# Patient Record
Sex: FEMALE | Race: WHITE | NOT HISPANIC OR LATINO | URBAN - METROPOLITAN AREA
[De-identification: names, ages, dates, MRNs, and addresses within clinical notes are randomized per-mention and may not be internally consistent; named-entity substitution may affect disease eponyms.]

---

## 2017-09-16 ENCOUNTER — OUTPATIENT (OUTPATIENT)
Dept: OUTPATIENT SERVICES | Facility: HOSPITAL | Age: 57
LOS: 1 days | Discharge: HOME | End: 2017-09-16

## 2017-09-16 DIAGNOSIS — N20.2 CALCULUS OF KIDNEY WITH CALCULUS OF URETER: ICD-10-CM

## 2017-09-16 DIAGNOSIS — S92.909A UNSPECIFIED FRACTURE OF UNSPECIFIED FOOT, INITIAL ENCOUNTER FOR CLOSED FRACTURE: ICD-10-CM

## 2017-09-16 DIAGNOSIS — Z12.31 ENCOUNTER FOR SCREENING MAMMOGRAM FOR MALIGNANT NEOPLASM OF BREAST: ICD-10-CM

## 2017-09-16 DIAGNOSIS — R05 COUGH: ICD-10-CM

## 2017-09-16 DIAGNOSIS — M54.5 LOW BACK PAIN: ICD-10-CM

## 2017-09-16 DIAGNOSIS — R10.2 PELVIC AND PERINEAL PAIN: ICD-10-CM

## 2017-09-16 DIAGNOSIS — M25.569 PAIN IN UNSPECIFIED KNEE: ICD-10-CM

## 2017-09-28 ENCOUNTER — APPOINTMENT (OUTPATIENT)
Dept: VASCULAR SURGERY | Facility: CLINIC | Age: 57
End: 2017-09-28
Payer: MEDICAID

## 2017-09-28 VITALS
HEIGHT: 66 IN | SYSTOLIC BLOOD PRESSURE: 124 MMHG | BODY MASS INDEX: 28.12 KG/M2 | WEIGHT: 175 LBS | DIASTOLIC BLOOD PRESSURE: 82 MMHG

## 2017-09-28 DIAGNOSIS — Z87.891 PERSONAL HISTORY OF NICOTINE DEPENDENCE: ICD-10-CM

## 2017-09-28 PROCEDURE — 99203 OFFICE O/P NEW LOW 30 MIN: CPT

## 2017-09-28 PROCEDURE — 93970 EXTREMITY STUDY: CPT

## 2018-10-16 ENCOUNTER — OUTPATIENT (OUTPATIENT)
Dept: OUTPATIENT SERVICES | Facility: HOSPITAL | Age: 58
LOS: 1 days | Discharge: HOME | End: 2018-10-16

## 2018-10-16 DIAGNOSIS — Z12.31 ENCOUNTER FOR SCREENING MAMMOGRAM FOR MALIGNANT NEOPLASM OF BREAST: ICD-10-CM

## 2018-10-16 DIAGNOSIS — R05 COUGH: ICD-10-CM

## 2019-03-21 ENCOUNTER — APPOINTMENT (OUTPATIENT)
Dept: VASCULAR SURGERY | Facility: CLINIC | Age: 59
End: 2019-03-21

## 2019-04-18 ENCOUNTER — APPOINTMENT (OUTPATIENT)
Dept: VASCULAR SURGERY | Facility: CLINIC | Age: 59
End: 2019-04-18

## 2019-07-25 ENCOUNTER — APPOINTMENT (OUTPATIENT)
Dept: VASCULAR SURGERY | Facility: CLINIC | Age: 59
End: 2019-07-25
Payer: MEDICAID

## 2019-07-25 VITALS
SYSTOLIC BLOOD PRESSURE: 118 MMHG | BODY MASS INDEX: 28.12 KG/M2 | HEIGHT: 66 IN | WEIGHT: 175 LBS | DIASTOLIC BLOOD PRESSURE: 76 MMHG

## 2019-07-25 PROCEDURE — 99213 OFFICE O/P EST LOW 20 MIN: CPT

## 2019-07-25 PROCEDURE — 93971 EXTREMITY STUDY: CPT

## 2019-07-25 NOTE — DATA REVIEWED
[FreeTextEntry1] : Venous duplex showed no evidence of right lower extremity DVT . The right GSV was not incompetent, but multiple large incompetent varicosities noted in the thigh measuring 1.2 cm.

## 2019-07-25 NOTE — ASSESSMENT
[FreeTextEntry1] : Ms. Boss is a 58 year-old female with symptomatic large right lower extremities gross varicosities. Venous duplex showed no evidence of right lower extremity DVT . The right GSV was not incompetent, but multiple large incompetent varicosities noted in the thigh measuring 1.2 cm. She will benefit from stab phlebectomy for treatment of her right lower extremity symptomatic veins. I will plan on scheduling her once approval is obtained.

## 2019-07-25 NOTE — HISTORY OF PRESENT ILLNESS
[FreeTextEntry1] : Ms. Boss is a 58 year-old female with long standing history of varicose veins who underwent ligation on the right lower extremity in the past. She reports that she has been having symptoms of leg swelling, heaviness and discomfort over the veins , right worse than left, mostly at the end of the day or after long periods of walking or standing. She has tried compression stockings in the past and has failed to relieve her symptoms.

## 2019-10-17 ENCOUNTER — OUTPATIENT (OUTPATIENT)
Dept: OUTPATIENT SERVICES | Facility: HOSPITAL | Age: 59
LOS: 1 days | Discharge: HOME | End: 2019-10-17
Payer: MEDICAID

## 2019-10-17 DIAGNOSIS — Z87.891 PERSONAL HISTORY OF NICOTINE DEPENDENCE: ICD-10-CM

## 2019-10-17 DIAGNOSIS — Z12.31 ENCOUNTER FOR SCREENING MAMMOGRAM FOR MALIGNANT NEOPLASM OF BREAST: ICD-10-CM

## 2019-10-17 PROCEDURE — 71046 X-RAY EXAM CHEST 2 VIEWS: CPT | Mod: 26

## 2019-10-17 PROCEDURE — 77067 SCR MAMMO BI INCL CAD: CPT | Mod: 26

## 2019-10-17 PROCEDURE — 77063 BREAST TOMOSYNTHESIS BI: CPT | Mod: 26

## 2019-10-28 ENCOUNTER — APPOINTMENT (OUTPATIENT)
Dept: GASTROENTEROLOGY | Facility: HOSPITAL | Age: 59
End: 2019-10-28

## 2020-07-23 ENCOUNTER — APPOINTMENT (OUTPATIENT)
Dept: VASCULAR SURGERY | Facility: CLINIC | Age: 60
End: 2020-07-23

## 2020-07-25 ENCOUNTER — EMERGENCY (EMERGENCY)
Facility: HOSPITAL | Age: 60
LOS: 0 days | Discharge: HOME | End: 2020-07-25
Attending: EMERGENCY MEDICINE | Admitting: EMERGENCY MEDICINE
Payer: MEDICAID

## 2020-07-25 VITALS
OXYGEN SATURATION: 98 % | RESPIRATION RATE: 16 BRPM | WEIGHT: 179.9 LBS | HEART RATE: 74 BPM | SYSTOLIC BLOOD PRESSURE: 119 MMHG | DIASTOLIC BLOOD PRESSURE: 78 MMHG | TEMPERATURE: 98 F
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
## 2020-07-25 DIAGNOSIS — M79.659 PAIN IN UNSPECIFIED THIGH: ICD-10-CM
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
## 2020-07-25 DIAGNOSIS — S39.011A STRAIN OF MUSCLE, FASCIA AND TENDON OF ABDOMEN, INITIAL ENCOUNTER: ICD-10-CM
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
## 2020-07-25 DIAGNOSIS — Z88.0 ALLERGY STATUS TO PENICILLIN: ICD-10-CM
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
## 2020-07-25 PROCEDURE — 99284 EMERGENCY DEPT VISIT MOD MDM: CPT

## 2020-07-25 RX ORDER — KETOROLAC TROMETHAMINE 30 MG/ML
1 SYRINGE (ML) INJECTION
Qty: 20 | Refills: 0
Start: 2020-07-25 | End: 2020-07-29

## 2020-07-25 RX ORDER — OXYCODONE AND ACETAMINOPHEN 5; 325 MG/1; MG/1
1 TABLET ORAL ONCE
Refills: 0 | Status: DISCONTINUED | OUTPATIENT
Start: 2020-07-25 | End: 2020-07-25

## 2020-07-25 RX ORDER — IBUPROFEN 200 MG
600 TABLET ORAL ONCE
Refills: 0 | Status: COMPLETED | OUTPATIENT
Start: 2020-07-25 | End: 2020-07-25

## 2020-07-25 RX ORDER — METHOCARBAMOL 500 MG/1
1000 TABLET, FILM COATED ORAL ONCE
Refills: 0 | Status: COMPLETED | OUTPATIENT
Start: 2020-07-25 | End: 2020-07-25

## 2020-07-25 RX ORDER — IBUPROFEN 200 MG
1 TABLET ORAL
Qty: 21 | Refills: 0
Start: 2020-07-25 | End: 2020-07-31

## 2020-07-25 RX ORDER — METHOCARBAMOL 500 MG/1
1 TABLET, FILM COATED ORAL
Qty: 21 | Refills: 0
Start: 2020-07-25 | End: 2020-07-31

## 2020-07-25 RX ADMIN — OXYCODONE AND ACETAMINOPHEN 1 TABLET(S): 5; 325 TABLET ORAL at 08:22

## 2020-07-25 RX ADMIN — Medication 600 MILLIGRAM(S): at 08:23

## 2020-07-25 RX ADMIN — Medication 600 MILLIGRAM(S): at 07:33

## 2020-07-25 RX ADMIN — METHOCARBAMOL 1000 MILLIGRAM(S): 500 TABLET, FILM COATED ORAL at 07:33

## 2020-07-25 RX ADMIN — OXYCODONE AND ACETAMINOPHEN 1 TABLET(S): 5; 325 TABLET ORAL at 09:28

## 2020-07-25 NOTE — ED ADULT NURSE NOTE - OBJECTIVE STATEMENT
pt A&ox3, pt states right groin pain since Thursday that has been worsening. pt states that she was cleaning, denies recent falls.

## 2020-07-25 NOTE — ED PROVIDER NOTE - PATIENT PORTAL LINK FT
You can access the FollowMyHealth Patient Portal offered by [facility] by registering at the following website: [email or web address]. By joining [email or web address]’s FollowMyHealth portal, you will also be able to view your health information using other applications (apps) compatible with our system.

## 2020-07-25 NOTE — ED PROVIDER NOTE - ATTENDING CONTRIBUTION TO CARE
59 year old female, no pmhx, presenting with right groin pain x 2 days that has been worsening. States she was cleaning her home at which time she felt a pull in her groin and since then has had pain described as achy, crampy, non-radiating, worse with various movements, relieved with rest, moderate severity. Denies having this before. Otherwise denies direct trauma, fevers, incontinence, numbness/weakness or any other symptoms. Has been able to ambulate.    Vital Signs: I have reviewed the initial vital signs.  Constitutional: NAD, well-nourished, appears stated age, no acute distress.  HEENT: Airway patent, moist MM, no erythema/swelling/deformity of oral structures. EOMI, PERRLA.  CV: regular rate, regular rhythm, well-perfused extremities, 2+ b/l DP and radial pulses equal.  Lungs: BCTA, no increased WOB.  ABD: NTND, no guarding or rebound, no pulsatile mass, no hernias.   MSK: Neck supple, nontender, nl ROM, no stepoff. Chest nontender. Back nontender in TLS spine or to b/l bony structures or flanks. Ext nontender, nl rom, no deformity.   INTEG: Skin warm, dry, no rash.  NEURO: A&Ox3, normal strength, nl sensation throughout, normal speech.   PSYCH: Calm, cooperative, normal affect and interaction.    Full ROM of leg but with pain. Likely muscular strain. Able to bear weight on the leg, neurovascularly intact. Will treat symptomatically, re-eval.

## 2020-07-25 NOTE — ED PROVIDER NOTE - CLINICAL SUMMARY MEDICAL DECISION MAKING FREE TEXT BOX
Patient presented s/p right groin strain while cleaning her home 2 days ago. Otherwise afebrile, HD stable, neurovascularly intact, no signs of infection on exam, no concern for fx based on exam. Able to bear weight on the leg. Given NSAID and muscle relaxer in ED with significant improvement of symptoms, after which time patient able to ambulate without difficulty. Will discharge home with outpatient follow up. Patient agreeable with plan. Agrees to return to ED for any new or worsening symptoms.

## 2020-07-25 NOTE — ED PROVIDER NOTE - NSFOLLOWUPINSTRUCTIONS_ED_ALL_ED_FT
Groin Pain    WHAT YOU NEED TO KNOW:    Groin pain may be felt only in your groin, or it may spread to your buttocks, thigh, or knee. An injury to your hip joint, pelvic area, lower back, or thighs can cause groin pain.    DISCHARGE INSTRUCTIONS:    Medicines: You may need any of the following:     NSAIDs, such as ibuprofen, help decrease swelling, pain, and fever. This medicine is available with or without a doctor's order. NSAIDs can cause stomach bleeding or kidney problems in certain people. If you take blood thinner medicine, always ask if NSAIDs are safe for you. Always read the medicine label and follow directions. Do not give these medicines to children under 6 months of age without direction from your child's healthcare provider.      Acetaminophen decreases pain. It is available without a doctor's order. Ask how much to take and how often to take it. Follow directions. Acetaminophen can cause liver damage if not taken correctly.       Take your medicine as directed. Contact your healthcare provider if you think your medicine is not helping or if you have side effects. Tell him of her if you are allergic to any medicine. Keep a list of the medicines, vitamins, and herbs you take. Include the amounts, and when and why you take them. Bring the list or the pill bottles to follow-up visits. Carry your medicine list with you in case of an emergency.    Follow up with your healthcare provider as directed: You may need to return for more tests. Write down your questions so you remember to ask them during your visits.     Self-care:     Rest as much as possible. Avoid activities that cause or increase your pain.      Apply ice on your groin for 15 to 20 minutes every hour or as directed. Use an ice pack, or put crushed ice in a plastic bag. Cover it with a towel. Ice helps prevent tissue damage and decreases swelling and pain.       Apply heat on your groin for 20 to 30 minutes every 2 hours for as many days as directed. Heat helps decrease pain and muscle spasms.     Contact your healthcare provider if:     You have a fever.       You have questions or concerns about your condition or care.    Return to the emergency department if:     You have severe pain even after you take medicine.       You have pain or burning when you urinate.       You have pain on your side that spreads to your groin.         © Copyright [employer] 2019 All illustrations and images included in CareNotes are the copyrighted property of [employer]M., Inc[employer] or [employer].

## 2020-07-25 NOTE — ED PROVIDER NOTE - OBJECTIVE STATEMENT
58 y/o female no PMHx BIBA c/o "I have right groin pain since Thursday after cleaning. It started slowly and has gotten much more painful. Pain is worse with movement of my leg." no hx trauma/ abd pain/ n/v/d/fever/ chills/ urinary symptoms

## 2020-07-25 NOTE — ED ADULT NURSE NOTE - NSIMPLEMENTINTERV_GEN_ALL_ED
Implemented All Universal Safety Interventions:  [place] to call system. Call bell, personal items and telephone within reach. Instruct patient to call for assistance. Room bathroom lighting operational. Non-slip footwear when patient is off stretcher. Physically safe environment: no spills, clutter or unnecessary equipment. Stretcher in lowest position, wheels locked, appropriate side rails in place.

## 2020-08-10 ENCOUNTER — APPOINTMENT (OUTPATIENT)
Dept: VASCULAR SURGERY | Facility: CLINIC | Age: 60
End: 2020-08-10

## 2020-09-03 ENCOUNTER — APPOINTMENT (OUTPATIENT)
Dept: VASCULAR SURGERY | Facility: CLINIC | Age: 60
End: 2020-09-03
Payer: MEDICAID

## 2020-09-03 VITALS
SYSTOLIC BLOOD PRESSURE: 120 MMHG | BODY MASS INDEX: 29.86 KG/M2 | WEIGHT: 185 LBS | DIASTOLIC BLOOD PRESSURE: 68 MMHG | TEMPERATURE: 97.7 F

## 2020-09-03 DIAGNOSIS — I83.90 ASYMPTOMATIC VARICOSE VEINS OF UNSPECIFIED LOWER EXTREMITY: ICD-10-CM

## 2020-09-03 PROCEDURE — 99213 OFFICE O/P EST LOW 20 MIN: CPT

## 2020-09-03 PROCEDURE — 93971 EXTREMITY STUDY: CPT

## 2020-09-03 NOTE — ASSESSMENT
[FreeTextEntry1] : Ms. Boss is a 58 year-old female with symptomatic large right lower extremities gross varicosities. Venous duplex showed no evidence of right lower extremity DVT . The right GSV was not incompetent, but multiple large incompetent varicosities noted in the thigh measuring 1.6 cm. She will benefit from stab phlebectomy for treatment of her right lower extremity symptomatic veins. I will plan on scheduling her once approval is obtained.

## 2020-09-17 ENCOUNTER — OUTPATIENT (OUTPATIENT)
Dept: OUTPATIENT SERVICES | Facility: HOSPITAL | Age: 60
LOS: 1 days | Discharge: HOME | End: 2020-09-17
Payer: MEDICAID

## 2020-09-17 PROCEDURE — 71046 X-RAY EXAM CHEST 2 VIEWS: CPT | Mod: 26

## 2020-10-13 ENCOUNTER — OUTPATIENT (OUTPATIENT)
Dept: OUTPATIENT SERVICES | Facility: HOSPITAL | Age: 60
LOS: 1 days | Discharge: HOME | End: 2020-10-13
Payer: MEDICAID

## 2020-10-13 VITALS
TEMPERATURE: 98 F | WEIGHT: 179.9 LBS | HEIGHT: 65 IN | DIASTOLIC BLOOD PRESSURE: 70 MMHG | HEART RATE: 78 BPM | OXYGEN SATURATION: 97 % | RESPIRATION RATE: 16 BRPM | SYSTOLIC BLOOD PRESSURE: 126 MMHG

## 2020-10-13 DIAGNOSIS — I83.891 VARICOSE VEINS OF RIGHT LOWER EXTREMITY WITH OTHER COMPLICATIONS: ICD-10-CM

## 2020-10-13 DIAGNOSIS — Z01.818 ENCOUNTER FOR OTHER PREPROCEDURAL EXAMINATION: ICD-10-CM

## 2020-10-13 DIAGNOSIS — Z98.890 OTHER SPECIFIED POSTPROCEDURAL STATES: Chronic | ICD-10-CM

## 2020-10-13 DIAGNOSIS — Z98.891 HISTORY OF UTERINE SCAR FROM PREVIOUS SURGERY: Chronic | ICD-10-CM

## 2020-10-13 LAB
ALBUMIN SERPL ELPH-MCNC: 4.7 G/DL — SIGNIFICANT CHANGE UP (ref 3.5–5.2)
ALP SERPL-CCNC: 94 U/L — SIGNIFICANT CHANGE UP (ref 30–115)
ALT FLD-CCNC: 20 U/L — SIGNIFICANT CHANGE UP (ref 0–41)
ANION GAP SERPL CALC-SCNC: 12 MMOL/L — SIGNIFICANT CHANGE UP (ref 7–14)
APTT BLD: 32.3 SEC — SIGNIFICANT CHANGE UP (ref 27–39.2)
AST SERPL-CCNC: 19 U/L — SIGNIFICANT CHANGE UP (ref 0–41)
BASOPHILS # BLD AUTO: 0.04 K/UL — SIGNIFICANT CHANGE UP (ref 0–0.2)
BASOPHILS NFR BLD AUTO: 0.4 % — SIGNIFICANT CHANGE UP (ref 0–1)
BILIRUB SERPL-MCNC: 0.2 MG/DL — SIGNIFICANT CHANGE UP (ref 0.2–1.2)
BUN SERPL-MCNC: 18 MG/DL — SIGNIFICANT CHANGE UP (ref 10–20)
CALCIUM SERPL-MCNC: 9.9 MG/DL — SIGNIFICANT CHANGE UP (ref 8.5–10.1)
CHLORIDE SERPL-SCNC: 100 MMOL/L — SIGNIFICANT CHANGE UP (ref 98–110)
CO2 SERPL-SCNC: 25 MMOL/L — SIGNIFICANT CHANGE UP (ref 17–32)
CREAT SERPL-MCNC: 0.8 MG/DL — SIGNIFICANT CHANGE UP (ref 0.7–1.5)
EOSINOPHIL # BLD AUTO: 0.32 K/UL — SIGNIFICANT CHANGE UP (ref 0–0.7)
EOSINOPHIL NFR BLD AUTO: 3.1 % — SIGNIFICANT CHANGE UP (ref 0–8)
GLUCOSE SERPL-MCNC: 86 MG/DL — SIGNIFICANT CHANGE UP (ref 70–99)
HCT VFR BLD CALC: 43.4 % — SIGNIFICANT CHANGE UP (ref 37–47)
HGB BLD-MCNC: 14.5 G/DL — SIGNIFICANT CHANGE UP (ref 12–16)
IMM GRANULOCYTES NFR BLD AUTO: 0.5 % — HIGH (ref 0.1–0.3)
INR BLD: 0.89 RATIO — SIGNIFICANT CHANGE UP (ref 0.65–1.3)
LYMPHOCYTES # BLD AUTO: 2.74 K/UL — SIGNIFICANT CHANGE UP (ref 1.2–3.4)
LYMPHOCYTES # BLD AUTO: 26.5 % — SIGNIFICANT CHANGE UP (ref 20.5–51.1)
MCHC RBC-ENTMCNC: 29.8 PG — SIGNIFICANT CHANGE UP (ref 27–31)
MCHC RBC-ENTMCNC: 33.4 G/DL — SIGNIFICANT CHANGE UP (ref 32–37)
MCV RBC AUTO: 89.3 FL — SIGNIFICANT CHANGE UP (ref 81–99)
MONOCYTES # BLD AUTO: 0.8 K/UL — HIGH (ref 0.1–0.6)
MONOCYTES NFR BLD AUTO: 7.7 % — SIGNIFICANT CHANGE UP (ref 1.7–9.3)
NEUTROPHILS # BLD AUTO: 6.38 K/UL — SIGNIFICANT CHANGE UP (ref 1.4–6.5)
NEUTROPHILS NFR BLD AUTO: 61.8 % — SIGNIFICANT CHANGE UP (ref 42.2–75.2)
NRBC # BLD: 0 /100 WBCS — SIGNIFICANT CHANGE UP (ref 0–0)
PLATELET # BLD AUTO: 263 K/UL — SIGNIFICANT CHANGE UP (ref 130–400)
POTASSIUM SERPL-MCNC: 4.1 MMOL/L — SIGNIFICANT CHANGE UP (ref 3.5–5)
POTASSIUM SERPL-SCNC: 4.1 MMOL/L — SIGNIFICANT CHANGE UP (ref 3.5–5)
PROT SERPL-MCNC: 7.2 G/DL — SIGNIFICANT CHANGE UP (ref 6–8)
PROTHROM AB SERPL-ACNC: 10.2 SEC — SIGNIFICANT CHANGE UP (ref 9.95–12.87)
RBC # BLD: 4.86 M/UL — SIGNIFICANT CHANGE UP (ref 4.2–5.4)
RBC # FLD: 12.7 % — SIGNIFICANT CHANGE UP (ref 11.5–14.5)
SODIUM SERPL-SCNC: 137 MMOL/L — SIGNIFICANT CHANGE UP (ref 135–146)
WBC # BLD: 10.33 K/UL — SIGNIFICANT CHANGE UP (ref 4.8–10.8)
WBC # FLD AUTO: 10.33 K/UL — SIGNIFICANT CHANGE UP (ref 4.8–10.8)

## 2020-10-13 PROCEDURE — 93010 ELECTROCARDIOGRAM REPORT: CPT

## 2020-10-13 NOTE — H&P PST ADULT - HISTORY OF PRESENT ILLNESS
60yr old female states has had varicose veins for more than 10yrs but has been having increased pressure in the RT LE while walking -over the last 3m-presents to pretesting for Right leg stab phlebectomy of varicose veins. Denies COVID S/S. Denies sick contacts. Advised to follow preventive measures for COVID-Verbalized understanding of instructions. Exercise jhonny 3 FOS.  Anesthesia Alert  NO--Difficult Airway  NO--History of neck surgery or radiation  NO--Limited ROM of neck  NO--History of Malignant hyperthermia  NO--Personal or family history of Pseudocholinesterase deficiency  NO--Prior Anesthesia Complication  NO--Latex Allergy  NO--Loose teeth  NO--History of Rheumatoid Arthritis  NO--JOY  NO--Other_____

## 2020-10-22 PROBLEM — Z86.79 PERSONAL HISTORY OF OTHER DISEASES OF THE CIRCULATORY SYSTEM: Chronic | Status: ACTIVE | Noted: 2020-10-13

## 2020-10-26 ENCOUNTER — APPOINTMENT (OUTPATIENT)
Dept: OTOLARYNGOLOGY | Facility: CLINIC | Age: 60
End: 2020-10-26
Payer: MEDICAID

## 2020-10-26 VITALS — BODY MASS INDEX: 29.05 KG/M2 | WEIGHT: 180 LBS

## 2020-10-26 DIAGNOSIS — E04.2 NONTOXIC MULTINODULAR GOITER: ICD-10-CM

## 2020-10-26 PROCEDURE — 99203 OFFICE O/P NEW LOW 30 MIN: CPT

## 2020-10-26 PROCEDURE — 99072 ADDL SUPL MATRL&STAF TM PHE: CPT

## 2020-10-26 NOTE — HISTORY OF PRESENT ILLNESS
[de-identified] : Patient presents today due to thyroid nodules. Patient admits first time having abnormal thyroid blood work. Patient had thyroid sonogram revealing heterogenous thyroid with nodules. No dysphagia. No choking. No family hx of thyroid conditions. No radiation exposure.

## 2020-10-27 ENCOUNTER — LABORATORY RESULT (OUTPATIENT)
Age: 60
End: 2020-10-27

## 2020-10-27 ENCOUNTER — OUTPATIENT (OUTPATIENT)
Dept: OUTPATIENT SERVICES | Facility: HOSPITAL | Age: 60
LOS: 1 days | Discharge: HOME | End: 2020-10-27

## 2020-10-27 DIAGNOSIS — Z98.890 OTHER SPECIFIED POSTPROCEDURAL STATES: Chronic | ICD-10-CM

## 2020-10-27 DIAGNOSIS — Z98.891 HISTORY OF UTERINE SCAR FROM PREVIOUS SURGERY: Chronic | ICD-10-CM

## 2020-10-27 DIAGNOSIS — Z11.59 ENCOUNTER FOR SCREENING FOR OTHER VIRAL DISEASES: ICD-10-CM

## 2020-10-30 ENCOUNTER — APPOINTMENT (OUTPATIENT)
Dept: VASCULAR SURGERY | Facility: HOSPITAL | Age: 60
End: 2020-10-30

## 2020-10-30 ENCOUNTER — RESULT REVIEW (OUTPATIENT)
Age: 60
End: 2020-10-30

## 2020-10-30 ENCOUNTER — OUTPATIENT (OUTPATIENT)
Dept: OUTPATIENT SERVICES | Facility: HOSPITAL | Age: 60
LOS: 1 days | Discharge: HOME | End: 2020-10-30
Payer: MEDICAID

## 2020-10-30 VITALS
SYSTOLIC BLOOD PRESSURE: 124 MMHG | OXYGEN SATURATION: 100 % | RESPIRATION RATE: 15 BRPM | TEMPERATURE: 98 F | DIASTOLIC BLOOD PRESSURE: 60 MMHG | HEART RATE: 64 BPM

## 2020-10-30 VITALS
TEMPERATURE: 98 F | RESPIRATION RATE: 16 BRPM | HEART RATE: 65 BPM | HEIGHT: 65 IN | DIASTOLIC BLOOD PRESSURE: 69 MMHG | WEIGHT: 184.97 LBS | OXYGEN SATURATION: 100 % | SYSTOLIC BLOOD PRESSURE: 146 MMHG

## 2020-10-30 DIAGNOSIS — Z98.890 OTHER SPECIFIED POSTPROCEDURAL STATES: Chronic | ICD-10-CM

## 2020-10-30 DIAGNOSIS — Z98.891 HISTORY OF UTERINE SCAR FROM PREVIOUS SURGERY: Chronic | ICD-10-CM

## 2020-10-30 PROCEDURE — 37766 PHLEB VEINS - EXTREM 20+: CPT

## 2020-10-30 PROCEDURE — 88304 TISSUE EXAM BY PATHOLOGIST: CPT | Mod: 26

## 2020-10-30 RX ORDER — OXYCODONE AND ACETAMINOPHEN 5; 325 MG/1; MG/1
1 TABLET ORAL ONCE
Refills: 0 | Status: DISCONTINUED | OUTPATIENT
Start: 2020-10-30 | End: 2020-10-30

## 2020-10-30 RX ORDER — ONDANSETRON 8 MG/1
4 TABLET, FILM COATED ORAL ONCE
Refills: 0 | Status: DISCONTINUED | OUTPATIENT
Start: 2020-10-30 | End: 2020-10-30

## 2020-10-30 RX ORDER — MORPHINE SULFATE 50 MG/1
4 CAPSULE, EXTENDED RELEASE ORAL
Refills: 0 | Status: DISCONTINUED | OUTPATIENT
Start: 2020-10-30 | End: 2020-10-30

## 2020-10-30 RX ORDER — KETOROLAC TROMETHAMINE 30 MG/ML
30 SYRINGE (ML) INJECTION ONCE
Refills: 0 | Status: DISCONTINUED | OUTPATIENT
Start: 2020-10-30 | End: 2020-10-30

## 2020-10-30 RX ADMIN — Medication 30 MILLIGRAM(S): at 10:15

## 2020-10-30 RX ADMIN — Medication 30 MILLIGRAM(S): at 10:30

## 2020-10-30 NOTE — PRE-ANESTHESIA EVALUATION ADULT - NSANTHADDINFOFT_GEN_ALL_CORE
Pt seen and examined.  Does not take any medications.  VSS.  Fmr smoker.  No problems with anesthesia.  Plan is GA.

## 2020-10-30 NOTE — BRIEF OPERATIVE NOTE - NSICDXBRIEFPROCEDURE_GEN_ALL_CORE_FT
PROCEDURES:  Stab phlebectomy, varicose vein, greater than 20 stab incisions 30-Oct-2020 09:40:48  Octavio Null

## 2020-10-30 NOTE — ASU DISCHARGE PLAN (ADULT/PEDIATRIC) - CARE PROVIDER_API CALL
Octavio Null  SURGERY  [address] [place] [id number]  Phone: [phone]  Fax: [phone]  Follow Up Time: 2 weeks

## 2020-11-02 LAB — SURGICAL PATHOLOGY STUDY: SIGNIFICANT CHANGE UP

## 2020-11-04 DIAGNOSIS — I83.891 VARICOSE VEINS OF RIGHT LOWER EXTREMITY WITH OTHER COMPLICATIONS: ICD-10-CM

## 2020-11-04 DIAGNOSIS — Z87.891 PERSONAL HISTORY OF NICOTINE DEPENDENCE: ICD-10-CM

## 2020-11-16 ENCOUNTER — APPOINTMENT (OUTPATIENT)
Dept: VASCULAR SURGERY | Facility: CLINIC | Age: 60
End: 2020-11-16
Payer: MEDICAID

## 2020-11-16 VITALS — TEMPERATURE: 97.3 F

## 2020-11-16 DIAGNOSIS — I83.891 VARICOSE VEINS OF RIGHT LOWER EXTREMITY WITH OTHER COMPLICATIONS: ICD-10-CM

## 2020-11-16 PROCEDURE — 99213 OFFICE O/P EST LOW 20 MIN: CPT

## 2020-11-16 NOTE — ASSESSMENT
[FreeTextEntry1] : Ms. Boss is a 60 year-old female with symptomatic large right lower extremities gross varicosities and underwent stab microphlebectomy on 10/30/2020.\par \par She is very happy with the results, surgical incisions are healed well. She c/o left lower extremity varicose veins and advised follow up in one months time for left lower extremity reflux study.

## 2020-11-16 NOTE — HISTORY OF PRESENT ILLNESS
[FreeTextEntry1] : Ms. Boss is a 60 year-old female with long standing history of varicose veins who underwent ligation on the right lower extremity in the past. She developed large varicosities that she was symptomatic from and underwent stab microphlebectomy on 10/30/2020. She is very happy with the results.

## 2021-02-03 ENCOUNTER — OUTPATIENT (OUTPATIENT)
Dept: OUTPATIENT SERVICES | Facility: HOSPITAL | Age: 61
LOS: 1 days | Discharge: HOME | End: 2021-02-03
Payer: MEDICAID

## 2021-02-03 DIAGNOSIS — Z12.31 ENCOUNTER FOR SCREENING MAMMOGRAM FOR MALIGNANT NEOPLASM OF BREAST: ICD-10-CM

## 2021-02-03 DIAGNOSIS — Z98.890 OTHER SPECIFIED POSTPROCEDURAL STATES: Chronic | ICD-10-CM

## 2021-02-03 DIAGNOSIS — Z98.891 HISTORY OF UTERINE SCAR FROM PREVIOUS SURGERY: Chronic | ICD-10-CM

## 2021-02-03 PROCEDURE — 77063 BREAST TOMOSYNTHESIS BI: CPT | Mod: 26

## 2021-02-03 PROCEDURE — 77067 SCR MAMMO BI INCL CAD: CPT | Mod: 26

## 2021-03-18 ENCOUNTER — APPOINTMENT (OUTPATIENT)
Dept: VASCULAR SURGERY | Facility: CLINIC | Age: 61
End: 2021-03-18

## 2021-05-06 ENCOUNTER — APPOINTMENT (OUTPATIENT)
Dept: VASCULAR SURGERY | Facility: CLINIC | Age: 61
End: 2021-05-06

## 2021-06-07 ENCOUNTER — OUTPATIENT (OUTPATIENT)
Dept: OUTPATIENT SERVICES | Facility: HOSPITAL | Age: 61
LOS: 1 days | Discharge: HOME | End: 2021-06-07
Payer: MEDICAID

## 2021-06-07 DIAGNOSIS — Z98.891 HISTORY OF UTERINE SCAR FROM PREVIOUS SURGERY: Chronic | ICD-10-CM

## 2021-06-07 DIAGNOSIS — Z98.890 OTHER SPECIFIED POSTPROCEDURAL STATES: Chronic | ICD-10-CM

## 2021-06-07 DIAGNOSIS — R07.9 CHEST PAIN, UNSPECIFIED: ICD-10-CM

## 2021-06-07 PROCEDURE — 71046 X-RAY EXAM CHEST 2 VIEWS: CPT | Mod: 26

## 2021-06-22 ENCOUNTER — APPOINTMENT (OUTPATIENT)
Dept: VASCULAR SURGERY | Facility: CLINIC | Age: 61
End: 2021-06-22
Payer: MEDICAID

## 2021-06-22 VITALS — BODY MASS INDEX: 29.99 KG/M2 | TEMPERATURE: 97 F | HEIGHT: 65 IN | WEIGHT: 180 LBS

## 2021-06-22 VITALS — SYSTOLIC BLOOD PRESSURE: 129 MMHG | DIASTOLIC BLOOD PRESSURE: 87 MMHG | HEART RATE: 80 BPM

## 2021-06-22 PROCEDURE — 99213 OFFICE O/P EST LOW 20 MIN: CPT

## 2021-06-22 PROCEDURE — 93971 EXTREMITY STUDY: CPT

## 2021-06-22 NOTE — ASSESSMENT
[FreeTextEntry1] : Ms. Boss is a 60 year-old female with symptomatic large right lower extremities gross varicosities and underwent stab microphlebectomy on 10/30/2020. I performed venous duplex that showed no evidence of greater saphenous vein incompetency on her left lower extremity. The patient will benefit from a micro-step phlebectomy of her left leg incompetent tributary veins. I've discussed the risk benefits of procedure with the patient and she is in agreement. She rescheduled for January 2022\par \par She is very happy with the results, surgical incisions are healed well. She c/o left lower extremity varicose veins

## 2021-06-22 NOTE — DATA REVIEWED
[FreeTextEntry1] : Venous duplex on the left there is no evidence of acute deep venous phimosis or superficial palmar phlebitis. All major veins are patent and compressible. The main trunk of the greater saphenous vein is negative for reflux however there are multiple incompetent varicosities noted in the anterior thigh with the largest measurement of 8.9 mm

## 2021-06-22 NOTE — HISTORY OF PRESENT ILLNESS
[FreeTextEntry1] : Ms. Boss is a 60 year-old female with long standing history of varicose veins who underwent ligation on the right lower extremity in the past. She developed large varicosities that she was symptomatic from and underwent stab microphlebectomy on 10/30/2020. She is very happy with the results. today she presents for  left lower extremity reflux study.

## 2021-08-20 ENCOUNTER — EMERGENCY (EMERGENCY)
Facility: HOSPITAL | Age: 61
LOS: 0 days | Discharge: HOME | End: 2021-08-20
Attending: STUDENT IN AN ORGANIZED HEALTH CARE EDUCATION/TRAINING PROGRAM | Admitting: STUDENT IN AN ORGANIZED HEALTH CARE EDUCATION/TRAINING PROGRAM
Payer: MEDICAID

## 2021-08-20 VITALS
RESPIRATION RATE: 18 BRPM | HEIGHT: 65 IN | OXYGEN SATURATION: 99 % | SYSTOLIC BLOOD PRESSURE: 154 MMHG | HEART RATE: 77 BPM | DIASTOLIC BLOOD PRESSURE: 101 MMHG | TEMPERATURE: 98 F

## 2021-08-20 DIAGNOSIS — Z98.891 HISTORY OF UTERINE SCAR FROM PREVIOUS SURGERY: Chronic | ICD-10-CM

## 2021-08-20 DIAGNOSIS — Z98.890 OTHER SPECIFIED POSTPROCEDURAL STATES: Chronic | ICD-10-CM

## 2021-08-20 DIAGNOSIS — Z88.0 ALLERGY STATUS TO PENICILLIN: ICD-10-CM

## 2021-08-20 DIAGNOSIS — M25.531 PAIN IN RIGHT WRIST: ICD-10-CM

## 2021-08-20 PROCEDURE — 73130 X-RAY EXAM OF HAND: CPT | Mod: 26,RT

## 2021-08-20 PROCEDURE — 99283 EMERGENCY DEPT VISIT LOW MDM: CPT

## 2021-08-20 PROCEDURE — 73110 X-RAY EXAM OF WRIST: CPT | Mod: 26,RT

## 2021-08-20 RX ORDER — IBUPROFEN 200 MG
600 TABLET ORAL ONCE
Refills: 0 | Status: COMPLETED | OUTPATIENT
Start: 2021-08-20 | End: 2021-08-20

## 2021-08-20 RX ADMIN — Medication 600 MILLIGRAM(S): at 14:17

## 2021-08-20 NOTE — ED PROVIDER NOTE - ATTENDING CONTRIBUTION TO CARE
61 yo F no pmh pw wrist pain. R wrist pain for the past few weeks, located over radial aspect. No trauma or inciting incident. Worse with movement. No numbness/tingling, no decrease rom, no obvious deformity, no weakness.     CONSTITUTIONAL: Well-developed; well-nourished; in no acute distress. Sitting up and providing appropriate history and physical examination  SKIN: skin exam is warm and dry, no acute rash.  HEAD: Normocephalic; atraumatic.  EYES: PERRL, 3 mm bilateral, no nystagmus, EOM intact; conjunctiva and sclera clear.  ENT: No nasal discharge; airway clear.  NECK: Supple; non tender. + full passive ROM in all directions. No JVD  EXT: +ttp over R radial wrist and R thenar eminence. No snuffbox tenderness. Normal ROM. No clubbing, cyanosis or edema. Dp and Pt Pulses intact. Cap refill less than 3 seconds  NEURO: CN 2-12 intact, normal finger to nose, normal romberg, stable gait, no sensory or motor deficits, Alert, oriented, grossly unremarkable. No Focal deficits. GCS 15. NIH 0  PSYCH: Cooperative, appropriate.

## 2021-08-20 NOTE — ED PROVIDER NOTE - PHYSICAL EXAMINATION
CONSTITUTIONAL: Well-developed; well-nourished; in no acute distress.   SKIN: warm, dry  HEAD: Normocephalic; atraumatic.  EYES: PERRL, EOMI, no conjunctival erythema  ENT: No nasal discharge; airway clear.  NECK: Supple; non tender.  CARD: S1, S2 normal; no murmurs, gallops, or rubs. Regular rate and rhythm.   RESP: No wheezes, rales or rhonchi.  ABD: soft ntnd  EXT: TTP thenar eminence. Negative tinel's sign.   LYMPH: No acute cervical adenopathy.  NEURO: Alert, oriented, grossly unremarkable  PSYCH: Cooperative, appropriate.

## 2021-08-20 NOTE — ED PROVIDER NOTE - NSFOLLOWUPINSTRUCTIONS_ED_ALL_ED_FT
Wrist Pain, Adult    There are many things that can cause wrist pain. Some common causes include:    An injury to the wrist area, such as a sprain, strain, or fracture.  Overuse of the joint.  A condition that causes increased pressure on a nerve in the wrist (carpal tunnel syndrome).  Wear and tear of the joints that occurs with aging (osteoarthritis).  A variety of other types of arthritis.    Sometimes, the cause of wrist pain is not known. Often, the pain goes away when you follow instructions from your health care provider for relieving pain at home, such as resting or icing the wrist. If your wrist pain continues, it is important to tell your health care provider.    Follow these instructions at home:  Rest the wrist area for at least 48 hours or as long as told by your health care provider.  If a splint or elastic bandage has been applied, use it as told by your health care provider.    Remove the splint or bandage only as told by your health care provider.  Loosen the splint or bandage if your fingers tingle, become numb, or turn cold or blue.    ImageIf directed, apply ice to the injured area.    If you have a removable splint or elastic bandage, remove it as told by your health care provider.  Put ice in a plastic bag.  Place a towel between your skin and the bag or between your splint or bandage and the bag.  Leave the ice on for 20 minutes, 2–3 times a day.    Keep your arm raised (elevated) above the level of your heart while you are sitting or lying down.  Take over-the-counter and prescription medicines only as told by your health care provider.  Keep all follow-up visits as told by your health care provider. This is important.  Contact a health care provider if:  You have a sudden sharp pain in the wrist, hand, or arm that is different or new.  The swelling or bruising on your wrist or hand gets worse.  Your skin becomes red, gets a rash, or has open sores.  Your pain does not get better or it gets worse.  Get help right away if:  You lose feeling in your fingers or hand.  Your fingers turn white, very red, or cold and blue.  You cannot move your fingers.  You have a fever or chills.  This information is not intended to replace advice given to you by your health care provider. Make sure you discuss any questions you have with your health care provider.

## 2021-08-20 NOTE — ED PROVIDER NOTE - OBJECTIVE STATEMENT
60 y f, no pmh, pw wrist pain. Pain has been going on for the past year, worse for the past week, dull, worse with movement and typing. Denies trauma to the region. Not taking medication for the pain. Denies pain/injury elsewhere.

## 2021-08-20 NOTE — ED PROVIDER NOTE - PATIENT PORTAL LINK FT
You can access the FollowMyHealth Patient Portal offered by [facility] by registering at the following website: [email or web address]. By joining [employer]’s FollowMyHealth portal, you will also be able to view your health information using other applications (apps) compatible with our system.

## 2021-08-20 NOTE — ED PROVIDER NOTE - CARE PROVIDER_API CALL
Oliver Esposito)  Orthopaedic Surgery  [address] [place] [address]  Phone: [phone]  Fax: [phone]  Follow Up Time: Urgent

## 2021-08-20 NOTE — ED PROVIDER NOTE - CLINICAL SUMMARY MEDICAL DECISION MAKING FREE TEXT BOX
I personally evaluated the patient. I reviewed the Resident’s or Physician Assistant’s note (as assigned above), and agree with the findings and plan except as documented in my note. Patient evaluated for wrist pain. Normal rom, neurovascularly intact. Offered splint or wrap, patient refused. Instructed to f/u with hand/ortho. I have fully discussed the medical management and delivery of care with the patient. I have discussed any available labs, imaging and treatment options with the patient. Patient confirms understanding and has been given detailed return precautions. Patient instructed to return to the ED should symptoms persist or worsen. Patient has demonstrated capacity and has verbalized understanding. Patient is well appearing upon discharge.

## 2021-08-20 NOTE — ED PROVIDER NOTE - NS ED ROS FT
Eyes:  No visual changes, eye pain or discharge.  ENMT:  No hearing changes, pain, no sore throat or runny nose, no difficulty swallowing  Cardiac:  No chest pain, SOB or edema. No chest pain with exertion.  Respiratory:  No cough or respiratory distress. No hemoptysis. No history of asthma or RAD.  GI:  No nausea, vomiting, diarrhea or abdominal pain.  :  No dysuria, frequency or burning.  MS:  +wrist pain  Neuro:  No headache or weakness.  No LOC.  Skin:  No skin rash.   Endocrine: No history of thyroid disease or diabetes.

## 2021-09-08 ENCOUNTER — APPOINTMENT (OUTPATIENT)
Dept: ORTHOPEDIC SURGERY | Facility: CLINIC | Age: 61
End: 2021-09-08

## 2021-09-29 ENCOUNTER — APPOINTMENT (OUTPATIENT)
Dept: RHEUMATOLOGY | Facility: CLINIC | Age: 61
End: 2021-09-29
Payer: MEDICAID

## 2021-09-29 ENCOUNTER — RESULT REVIEW (OUTPATIENT)
Age: 61
End: 2021-09-29

## 2021-09-29 VITALS
OXYGEN SATURATION: 98 % | SYSTOLIC BLOOD PRESSURE: 122 MMHG | HEIGHT: 65 IN | HEART RATE: 79 BPM | TEMPERATURE: 98 F | BODY MASS INDEX: 30.82 KG/M2 | DIASTOLIC BLOOD PRESSURE: 80 MMHG | WEIGHT: 185 LBS

## 2021-09-29 PROCEDURE — 99204 OFFICE O/P NEW MOD 45 MIN: CPT

## 2021-09-30 NOTE — ASSESSMENT
[FreeTextEntry1] : 61-year-old female who presents today for an evaluation of right wrist pain.\par \par 1. Arthritis\par -Right wrist and hand x-ray suggested degenerative changes\par -Check RF, CCP, AMY, ESR, CRP and right wrist ultrasound \par -Continue NSAIDs and brace\par \par \par

## 2021-09-30 NOTE — HISTORY OF PRESENT ILLNESS
[FreeTextEntry1] : 61-year-old female who presents today for an evaluation of right hand pain.\par \par Patient reports in August developing acute pain in her right hand.  She thinks initial trauma may have been from holding onto the bus handle and abruptly feeling pain after losing balance.  She went to see urgent care and orthopedic surgery.  X-rays did not show acute fracture and suggested arthritis.  Symptoms are 10 out of 10 in severity characterizes sharp, throbbing, shooting, better with brace and Motrin, Tylenol, Aleve worse when lying down.  Morning stiffness is for 1 hour and there is no swelling.  She is very active and use her hands a lot.  She has noticed difficulty with opening objects like cans.  Denies fever, rash, fatigue.\par \par \par

## 2021-09-30 NOTE — PHYSICAL EXAM
[General Appearance - Alert] : alert [General Appearance - In No Acute Distress] : in no acute distress [Sclera] : the sclera and conjunctiva were normal [PERRL With Normal Accommodation] : pupils were equal in size, round, and reactive to light [Outer Ear] : the ears and nose were normal in appearance [Neck Appearance] : the appearance of the neck was normal [] : no respiratory distress [Exaggerated Use Of Accessory Muscles For Inspiration] : no accessory muscle use [Heart Rate And Rhythm] : heart rate was normal and rhythm regular [Heart Sounds] : normal S1 and S2 [Bowel Sounds] : normal bowel sounds [Abdomen Soft] : soft [Abnormal Walk] : normal gait [Nail Clubbing] : no clubbing  or cyanosis of the fingernails [Involuntary Movements] : no involuntary movements were seen [Musculoskeletal - Swelling] : no joint swelling seen [Motor Tone] : muscle strength and tone were normal [Affect] : the affect was normal [Mood] : the mood was normal [FreeTextEntry1] : Right wrist no joint swelling, tender volar side radially. No signs of synovitis.

## 2021-10-07 ENCOUNTER — OUTPATIENT (OUTPATIENT)
Dept: OUTPATIENT SERVICES | Facility: HOSPITAL | Age: 61
LOS: 1 days | Discharge: HOME | End: 2021-10-07
Payer: MEDICAID

## 2021-10-07 DIAGNOSIS — Z98.890 OTHER SPECIFIED POSTPROCEDURAL STATES: Chronic | ICD-10-CM

## 2021-10-07 DIAGNOSIS — M54.16 RADICULOPATHY, LUMBAR REGION: ICD-10-CM

## 2021-10-07 DIAGNOSIS — Z98.891 HISTORY OF UTERINE SCAR FROM PREVIOUS SURGERY: Chronic | ICD-10-CM

## 2021-10-07 PROCEDURE — 72110 X-RAY EXAM L-2 SPINE 4/>VWS: CPT | Mod: 26

## 2021-10-08 LAB
CRP SERPL-MCNC: <3 MG/L
ERYTHROCYTE [SEDIMENTATION RATE] IN BLOOD BY WESTERGREN METHOD: 8 MM/HR
RHEUMATOID FACT SER QL: 11 IU/ML

## 2021-10-11 LAB
ANA SER IF-ACNC: NEGATIVE
CCP AB SER IA-ACNC: <8 UNITS
RF+CCP IGG SER-IMP: NEGATIVE

## 2021-10-20 ENCOUNTER — OUTPATIENT (OUTPATIENT)
Dept: OUTPATIENT SERVICES | Facility: HOSPITAL | Age: 61
LOS: 1 days | Discharge: HOME | End: 2021-10-20
Payer: MEDICAID

## 2021-10-20 ENCOUNTER — EMERGENCY (EMERGENCY)
Facility: HOSPITAL | Age: 61
LOS: 0 days | Discharge: HOME | End: 2021-10-20
Attending: STUDENT IN AN ORGANIZED HEALTH CARE EDUCATION/TRAINING PROGRAM | Admitting: STUDENT IN AN ORGANIZED HEALTH CARE EDUCATION/TRAINING PROGRAM
Payer: MEDICAID

## 2021-10-20 ENCOUNTER — APPOINTMENT (OUTPATIENT)
Dept: RHEUMATOLOGY | Facility: CLINIC | Age: 61
End: 2021-10-20
Payer: MEDICAID

## 2021-10-20 VITALS
RESPIRATION RATE: 18 BRPM | DIASTOLIC BLOOD PRESSURE: 67 MMHG | SYSTOLIC BLOOD PRESSURE: 157 MMHG | TEMPERATURE: 98 F | OXYGEN SATURATION: 95 % | WEIGHT: 184.97 LBS | HEIGHT: 65 IN | HEART RATE: 74 BPM

## 2021-10-20 VITALS
SYSTOLIC BLOOD PRESSURE: 140 MMHG | OXYGEN SATURATION: 98 % | BODY MASS INDEX: 30.82 KG/M2 | WEIGHT: 185 LBS | TEMPERATURE: 98 F | DIASTOLIC BLOOD PRESSURE: 80 MMHG | HEIGHT: 65 IN | HEART RATE: 80 BPM

## 2021-10-20 DIAGNOSIS — M25.531 PAIN IN RIGHT WRIST: ICD-10-CM

## 2021-10-20 DIAGNOSIS — M25.532 PAIN IN LEFT WRIST: ICD-10-CM

## 2021-10-20 DIAGNOSIS — Z88.0 ALLERGY STATUS TO PENICILLIN: ICD-10-CM

## 2021-10-20 DIAGNOSIS — Z98.890 OTHER SPECIFIED POSTPROCEDURAL STATES: Chronic | ICD-10-CM

## 2021-10-20 DIAGNOSIS — M65.88 OTHER SYNOVITIS AND TENOSYNOVITIS, OTHER SITE: ICD-10-CM

## 2021-10-20 DIAGNOSIS — F17.200 NICOTINE DEPENDENCE, UNSPECIFIED, UNCOMPLICATED: ICD-10-CM

## 2021-10-20 DIAGNOSIS — Z98.891 HISTORY OF UTERINE SCAR FROM PREVIOUS SURGERY: Chronic | ICD-10-CM

## 2021-10-20 PROCEDURE — 99284 EMERGENCY DEPT VISIT MOD MDM: CPT

## 2021-10-20 PROCEDURE — 76882 US LMTD JT/FCL EVL NVASC XTR: CPT | Mod: 26,RT

## 2021-10-20 PROCEDURE — 99215 OFFICE O/P EST HI 40 MIN: CPT

## 2021-10-20 PROCEDURE — 73130 X-RAY EXAM OF HAND: CPT | Mod: 26,RT

## 2021-10-20 PROCEDURE — 73110 X-RAY EXAM OF WRIST: CPT | Mod: 26,RT

## 2021-10-20 RX ORDER — KETOROLAC TROMETHAMINE 30 MG/ML
30 SYRINGE (ML) INJECTION ONCE
Refills: 0 | Status: DISCONTINUED | OUTPATIENT
Start: 2021-10-20 | End: 2021-10-20

## 2021-10-20 RX ORDER — METHOCARBAMOL 500 MG/1
1000 TABLET, FILM COATED ORAL ONCE
Refills: 0 | Status: COMPLETED | OUTPATIENT
Start: 2021-10-20 | End: 2021-10-20

## 2021-10-20 RX ORDER — NABUMETONE 500 MG/1
500 TABLET, FILM COATED ORAL
Qty: 14 | Refills: 3 | Status: ACTIVE | COMMUNITY
Start: 2021-10-20 | End: 1900-01-01

## 2021-10-20 RX ORDER — METHOCARBAMOL 500 MG/1
1 TABLET, FILM COATED ORAL
Qty: 21 | Refills: 0
Start: 2021-10-20 | End: 2021-10-26

## 2021-10-20 RX ADMIN — METHOCARBAMOL 1000 MILLIGRAM(S): 500 TABLET, FILM COATED ORAL at 19:53

## 2021-10-20 RX ADMIN — Medication 30 MILLIGRAM(S): at 18:33

## 2021-10-20 NOTE — ED ADULT NURSE NOTE - OBJECTIVE STATEMENT
pt received pt received steroid injections 5 days ago and hand is now swelling and more painful then before - sono done in pmd office

## 2021-10-20 NOTE — ASSESSMENT
[FreeTextEntry1] : 61-year-old female who presents today for an evaluation of right wrist pain.\par \par 1. Right wrist pain and swelling s/p intra-articular steroid injection\par -Patient with progressive worsening of pain and swelling volar aspect of her right wrist radially with pain out of proportion to exam. Her pain is at rest that worsens with finger flexion. She also has decreased sensation to touch in distribution of median nerve. My concern is for post injection infection given her significant pain at rest, and the possibility of trauma to her median nerve given decreased sensation in median nerve distribution. Tendon rupture is also a possibility but perhaps a little less likely given her ability to move all of the fingers in her right hand, albeit not very much due to pain. I expressed my concern for infection and median nerve injury, and I advised immediate evaluation in the ED. Patient understood and she agreed to go to the ED immediately. All of her questions were answered. I also discussed the case with her orthopedic surgeon (Dr. Pulido), who is aware. \par \par

## 2021-10-20 NOTE — PHYSICAL EXAM
[General Appearance - Alert] : alert [General Appearance - In No Acute Distress] : in no acute distress [Sclera] : the sclera and conjunctiva were normal [PERRL With Normal Accommodation] : pupils were equal in size, round, and reactive to light [Outer Ear] : the ears and nose were normal in appearance [Neck Appearance] : the appearance of the neck was normal [] : no respiratory distress [Exaggerated Use Of Accessory Muscles For Inspiration] : no accessory muscle use [Heart Rate And Rhythm] : heart rate was normal and rhythm regular [Heart Sounds] : normal S1 and S2 [Bowel Sounds] : normal bowel sounds [Abdomen Soft] : soft [Abnormal Walk] : normal gait [Nail Clubbing] : no clubbing  or cyanosis of the fingernails [Involuntary Movements] : no involuntary movements were seen [Musculoskeletal - Swelling] : no joint swelling seen [Motor Tone] : muscle strength and tone were normal [Affect] : the affect was normal [Mood] : the mood was normal [FreeTextEntry1] : Right 2nd-4th finger decreased sensation to touch

## 2021-10-20 NOTE — ED PROVIDER NOTE - PATIENT PORTAL LINK FT
You can access the FollowMyHealth Patient Portal offered by [facility] by registering at the following website: [email or web address]. By joining [employer]’s FollowMyHealth portal, you will also be able to view your health information using other applications (apps) compatible with our system.

## 2021-10-20 NOTE — ED PROVIDER NOTE - NSFOLLOWUPINSTRUCTIONS_ED_ALL_ED_FT
Tendinitis    WHAT YOU NEED TO KNOW:    Tendinitis is painful inflammation or breakdown of your tendons. It may also be called tendinopathy. Tendinitis often occurs in the knee, shoulder, ankle, hip, or elbow.    DISCHARGE INSTRUCTIONS:    Medicines:   •Pain medicines such as acetaminophen or NSAIDs may decrease swelling and pain or fever. These medicines are available without a doctor's order. Ask which medicine to take. Ask how much to take and when to take it. Follow directions. Acetaminophen and NSAIDs can cause liver or kidney damage if not taken correctly. If you take blood thinner medicine, always ask your healthcare provider if NSAIDs are safe for you. Always read the medicine label and follow the directions on it before using these medicine.       •Take your medicine as directed. Contact your healthcare provider if you think your medicine is not helping or if you have side effects. Tell him if you are allergic to any medicine. Keep a list of the medicines, vitamins, and herbs you take. Include the amounts, and when and why you take them. Bring the list or the pill bottles to follow-up visits. Carry your medicine list with you in case of an emergency.      Management:   •Rest your tendon as directed to help it heal. Ask your healthcare provider if you need to stop putting weight on your affected area.      •Ice helps decrease swelling and pain. Ice may also help prevent tissue damage. Use an ice pack, or put crushed ice in a plastic bag. Cover it with a towel and place it on the affected area for 10 to 15 minutes every hour or as directed.      •Support devices such as a cane, splint, shoe insert, or brace may help reduce your pain.      •Physical therapy may be ordered by your healthcare provider. This may be used to teach you exercises to help improve movement and strength, and to decrease pain. You may also learn how to improve your posture, and how to lift or exercise correctly.      Prevention:   •Stretch and warm up before you exercise.       •Exercise regularly to strengthen the muscles around your joint. Ease into an exercise routine for the first 3 weeks to prevent another injury. Ask your healthcare provider about the best exercise plan for you. Rest fully between activities.      •Use the right equipment for sports and exercise. Wear braces or tape around weak joints as directed.      Follow up with your healthcare provider as directed: Write down your questions so you remember to ask them during your visits.     Contact your healthcare provider if:   •You have increased pain even after you take medicine.      •You have questions or concerns about your condition or care.      Return to the emergency department if:   •You have increased redness over the joint, or swelling in the joint.      •You suddenly cannot move your joint.         © Copyright [employer] 2021           back to top                          © Copyright [employer] 2021

## 2021-10-20 NOTE — ED PROVIDER NOTE - ATTENDING CONTRIBUTION TO CARE
60 yo hx arthritis presents for L wrist pain. pt saw hand and had steroid injection. pt w/ pain to R distal radius/radiating up thumb. no f/c/redness. pt saw rheum who did sono showing some edema. no trauma. pt came for eval/pain control.    vss  gen- NAD, aaox3  card-rrr  lungs-ctab, no wheezing or rhonchi  neuro- full str/sensation, cn ii-xii grossly intact, normal coordination and gait  L wrist- mild swelling to base of wrist w/o erythema nor fluctuance. able to range wrist/fingers. initially, tenderness to distan radius which improved w/ mild massage, motor improved w/ massaging hand. str/sensation intact M/U/R    will get xr, nsaid, msk relaxant  possible injury to nerve w/ injection

## 2021-10-20 NOTE — ED PROVIDER NOTE - OBJECTIVE STATEMENT
61 y.o. female without any PMH presented to the ER c/o Left wrist pain for the past several weeks.  Pain worse after steroid injection a few days ago.  Denies fever, chills, extremity weakness/paresthesias.  No other complaints.

## 2021-10-20 NOTE — ED ADULT TRIAGE NOTE - CHIEF COMPLAINT QUOTE
pt ambulated to triage with complaint of pain in right hand pt received a shot in hand a couple days ago

## 2021-10-20 NOTE — ED PROVIDER NOTE - MUSCULOSKELETAL, MLM
Left wrist:  limited ROM due to pain, (+) mild swelling, no erythema,  (+) tenderness laterally, no motor or sensory deficit, cap refill < 2 sec

## 2021-10-20 NOTE — ED PROVIDER NOTE - CLINICAL SUMMARY MEDICAL DECISION MAKING FREE TEXT BOX
xr w/o acute findings, likely nerve injury w/ injection, will dc w/ nsaids, msk relaxants, outpt f/u and return precautions

## 2021-10-20 NOTE — HISTORY OF PRESENT ILLNESS
[FreeTextEntry1] : 61-year-old female who presents today for pain and swelling in her right wrist.\par \par \par Today's visit:\par Patient reports receiving intra articular steroid injection in right wrist with orthopedic surgery 10/15/21 (Friday). Since then she describes progressive, worsening pain on the radial side of her right volar wrist associated with swelling. She has been taking Ibuprofen 800 mg PRN without relief of her symptoms. She reports pain and rest that worsens with movement of her hand and fingers. Patient also reports numbness of her fingers.\par \par \par \par Initial visit:\par Patient reports in August developing acute pain in her right hand.  She thinks initial trauma may have been from holding onto the bus handle and abruptly feeling pain after losing balance.  She went to see urgent care and orthopedic surgery.  X-rays did not show acute fracture and suggested arthritis.  Symptoms are 10 out of 10 in severity characterizes sharp, throbbing, shooting, better with brace and Motrin, Tylenol, Aleve worse when lying down.  Morning stiffness is for 1 hour and there is no swelling.  She is very active and use her hands a lot.  She has noticed difficulty with opening objects like cans.  Denies fever, rash, fatigue.\par \par \par

## 2021-10-25 ENCOUNTER — APPOINTMENT (OUTPATIENT)
Dept: RHEUMATOLOGY | Facility: CLINIC | Age: 61
End: 2021-10-25

## 2022-02-28 ENCOUNTER — OUTPATIENT (OUTPATIENT)
Dept: OUTPATIENT SERVICES | Facility: HOSPITAL | Age: 62
LOS: 1 days | Discharge: HOME | End: 2022-02-28
Payer: MEDICAID

## 2022-02-28 DIAGNOSIS — R07.9 CHEST PAIN, UNSPECIFIED: ICD-10-CM

## 2022-02-28 DIAGNOSIS — M25.519 PAIN IN UNSPECIFIED SHOULDER: ICD-10-CM

## 2022-02-28 DIAGNOSIS — Z98.890 OTHER SPECIFIED POSTPROCEDURAL STATES: Chronic | ICD-10-CM

## 2022-02-28 DIAGNOSIS — Z98.891 HISTORY OF UTERINE SCAR FROM PREVIOUS SURGERY: Chronic | ICD-10-CM

## 2022-02-28 PROCEDURE — 73030 X-RAY EXAM OF SHOULDER: CPT | Mod: 26,RT

## 2022-02-28 PROCEDURE — 71046 X-RAY EXAM CHEST 2 VIEWS: CPT | Mod: 26

## 2022-04-14 ENCOUNTER — OUTPATIENT (OUTPATIENT)
Dept: OUTPATIENT SERVICES | Facility: HOSPITAL | Age: 62
LOS: 1 days | Discharge: HOME | End: 2022-04-14
Payer: MEDICAID

## 2022-04-14 DIAGNOSIS — Z98.891 HISTORY OF UTERINE SCAR FROM PREVIOUS SURGERY: Chronic | ICD-10-CM

## 2022-04-14 DIAGNOSIS — Z12.31 ENCOUNTER FOR SCREENING MAMMOGRAM FOR MALIGNANT NEOPLASM OF BREAST: ICD-10-CM

## 2022-04-14 DIAGNOSIS — Z98.890 OTHER SPECIFIED POSTPROCEDURAL STATES: Chronic | ICD-10-CM

## 2022-04-14 PROCEDURE — 77067 SCR MAMMO BI INCL CAD: CPT | Mod: 26

## 2022-04-14 PROCEDURE — 77063 BREAST TOMOSYNTHESIS BI: CPT | Mod: 26

## 2022-11-08 ENCOUNTER — OUTPATIENT (OUTPATIENT)
Dept: OUTPATIENT SERVICES | Facility: HOSPITAL | Age: 62
LOS: 1 days | Discharge: HOME | End: 2022-11-08

## 2022-11-08 DIAGNOSIS — Z98.891 HISTORY OF UTERINE SCAR FROM PREVIOUS SURGERY: Chronic | ICD-10-CM

## 2022-11-08 DIAGNOSIS — Z98.890 OTHER SPECIFIED POSTPROCEDURAL STATES: Chronic | ICD-10-CM

## 2022-11-11 DIAGNOSIS — N95.9 UNSPECIFIED MENOPAUSAL AND PERIMENOPAUSAL DISORDER: ICD-10-CM

## 2022-11-11 DIAGNOSIS — Z13.820 ENCOUNTER FOR SCREENING FOR OSTEOPOROSIS: ICD-10-CM

## 2022-12-08 ENCOUNTER — APPOINTMENT (OUTPATIENT)
Dept: VASCULAR SURGERY | Facility: CLINIC | Age: 62
End: 2022-12-08
Payer: MEDICAID

## 2022-12-08 VITALS — WEIGHT: 185 LBS | BODY MASS INDEX: 30.82 KG/M2 | HEIGHT: 65 IN

## 2022-12-08 DIAGNOSIS — I83.892 VARICOSE VEINS OF LEFT LOWER EXTREMITY WITH OTHER COMPLICATIONS: ICD-10-CM

## 2022-12-08 PROCEDURE — 93971 EXTREMITY STUDY: CPT

## 2022-12-08 PROCEDURE — 99213 OFFICE O/P EST LOW 20 MIN: CPT

## 2022-12-08 NOTE — HISTORY OF PRESENT ILLNESS
[FreeTextEntry1] : Ms. Boss is a 62 year-old female with long standing history of varicose veins who underwent ligation on the right lower extremity in the past. She developed large varicosities that she was symptomatic from and underwent stab microphlebectomy on 10/30/2020. She is very happy with the results. Today she presents for  left lower extremity reflux study.

## 2022-12-08 NOTE — REVIEW OF SYSTEMS
Pt feels she has BV and has had it often in the past. Would like to try a round of flagyl and if not better will come in. Also she has been spotting btwn cycles and would like to see gyn.
[Negative] : Heme/Lymph

## 2022-12-08 NOTE — DATA REVIEWED
[FreeTextEntry1] : I performed a venous duplex which was medically necessary to evaluate for GSV reflux.\par \par Venous duplex on the left there is no evidence of acute deep venous thrombosis or superficial phlebitis. All major veins are patent and compressible. The main trunk of the greater saphenous vein is negative for reflux however there are multiple incompetent varicosities noted in the anterior thigh and calf with the largest measurement of 9.7 mm.

## 2022-12-08 NOTE — ASSESSMENT
[FreeTextEntry1] : Ms. Boss is a 62 year-old female with symptomatic large left lower extremity varicosities and will require stab microphlebectomy for treatment.\par \par She will scheduled for the procedure once insurance approval is obtained.

## 2023-03-31 ENCOUNTER — APPOINTMENT (OUTPATIENT)
Dept: ORTHOPEDIC SURGERY | Facility: CLINIC | Age: 63
End: 2023-03-31

## 2023-04-19 NOTE — ED ADULT NURSE NOTE - NS ED NURSE LEVEL OF CONSCIOUSNESS SPEECH
Nephrology Initial Consult  April 19, 2023      Miguel Ángel Wilson MRN:[id number] YOB: 1991  Date of Admission:4/18/2023  Primary care provider: No Ref-Primary, Physician  Requesting physician: Luke Michaels MD    ASSESSMENT AND RECOMMENDATIONS:   Hypertension-As outpt is on Lisinopril 40mg daily, Metoprolol XL 25mg daily and torsemide for heart failure.  Ran out of meds (see pharmacy note but more meds would have ran out in ~Jan 2023 based on refill dates) so unclear if his current HTN on admit is his baseline or worse due to subcapsular bleeding/page kidney.  Restarted on Lisinopril and Metoprolol with PRN hydral and labetalol and BP's are at goal of -150 for now.  Was on Nicardipine for ~12h but now weaned off.    -Agree with resumption of home meds     -Lisinopril 40mg     -Metoprolol XL 25mg daily.      -PRN hydral and labetalol but would try to keep SBP >120 given his baseline BP's.      -Can consider diuretic depending on findings of ECHO this am but is overall stable from pulm standpoint.       CKD with L Subcapsular bleeding-Baseline Cr 1.6-1.8 since at least 11/2021, surprisingly at baseline despite acute issues with L kidney.  Went to IR for embolization this am, getting repeat imaging to evaluate for ongoing bleeding, surgery following for possible need for nephrectomy if he continues to bleed.  Would anticipate Cr may bump with multiple contrast loads for interventions.     -Cr at baseline, likely to have YOGI in coming days.     -Will need follow up in CKD clinic, has never seen nephrology.      Volume-No edema on exam, polyuric. Getting ECHO this am which should help guide whether diuretic is indicated.    HFrEF-EF 30-35% in Jan, rechecking today.  On Coreg and Lisinopril PTA.      Electrolytes-K 3.3, bicarb 25, Na 137.      BMD-Ca 8.7, Mg 1.4, Phos 3.2.      Anemia-Hgb 12.1 at baseline.      Nutrition-Taking PO PTA, NPO for procedures for now.      Time spent: 40 minutes on this date  of encounter for chart review, physical exam, medical decision making and co-ordination of care.     Discussed with Dr He    Recommendations were communicated to primary team via verbal communication.       ASHWINI Hoskins CNS  Clinical Nurse Specialist  [phone]    REASON FOR CONSULT: Requested to evaluate 31 yom for management of HTN in setting of renal capsular bleeding and IR embolization.     HISTORY OF PRESENT ILLNESS:  Miguel Ángel Wilson is a 31 yom with longstanding uncontrolled HTN, a-fib, CKD, CHF admitted for L flank pain with imaging revealing subcapular hematoma and pseudoaneurysm.  Went for IR embolization early am 4/19.  Baseline Cr 1.6-1.8 since at least 2021, was 1.8 on admit and has been stable within his baseline.  Pt having severe HTN, Nephrology consulted for management of HTN in setting of page kidney.      PAST MEDICAL HISTORY:  Past Medical History:   Diagnosis Date     Atrial flutter with rapid ventricular response (H) 1/31/2022     Essential hypertension 1/31/2022     Hypertension      Morbid obesity (H) 1/31/2022     Tobacco use 1/31/2022       History reviewed. No pertinent surgical history.     MEDICATIONS:  PTA Meds  Prior to Admission medications    Medication Sig Last Dose Taking? Auth Provider Long Term End Date   apixaban ANTICOAGULANT (ELIQUIS) 5 MG tablet Take 1 tablet (5 mg) by mouth 2 times daily  Yes Marivel Willis PA-C     lisinopril (ZESTRIL) 40 MG tablet Take 1 tablet (40 mg) by mouth daily  Yes Marivel Willis PA-C Yes    metoprolol succinate ER (TOPROL-XL) 25 MG 24 hr tablet Take 1 tablet (25 mg) by mouth daily  Yes Marivel Willis PA-C Yes    torsemide (DEMADEX) 20 MG tablet Take 1 tablet (20 mg) by mouth 2 times daily  Yes Marivel Willis PA-C Yes       Current Meds    lisinopril  40 mg Oral Daily     melatonin  3 mg Oral At Bedtime     metoprolol succinate ER  25 mg Oral Daily     sodium chloride (PF)  3 mL Intracatheter Q8H     Infusion  "Meds      ALLERGIES:    No Known Allergies    REVIEW OF SYSTEMS:  A 10 point review of systems was negative except as noted above.    SOCIAL HISTORY:   Social History     Socioeconomic History     Marital status: Single     Spouse name: Not on file     Number of children: Not on file     Years of education: Not on file     Highest education level: Not on file   Occupational History     Not on file   Tobacco Use     Smoking status: Some Days     Smokeless tobacco: Never   Vaping Use     Vaping status: Not on file   Substance and Sexual Activity     Alcohol use: No     Drug use: No     Sexual activity: Not on file   Other Topics Concern     Not on file   Social History Narrative     Not on file     Social Determinants of Health     Financial Resource Strain: Not on file   Food Insecurity: Not on file   Transportation Needs: Not on file   Physical Activity: Not on file   Stress: Not on file   Social Connections: Not on file   Intimate Partner Violence: Not on file   Housing Stability: Not on file     Reviewed with patient, noncontributory social hx.     FAMILY MEDICAL HISTORY:   Reviewed with pt, noncontributory family hx.      PHYSICAL EXAM:   Temp  Av.4  F (36.9  C)  Min: 98.3  F (36.8  C)  Max: 98.4  F (36.9  C)      Pulse  Av  Min: 89  Max: 125 Resp  Av.6  Min: 10  Max: 47  SpO2  Av.5 %  Min: 89 %  Max: 100 %       /88   Pulse 89   Temp 98.3  F (36.8  C) (Oral)   Resp (!) 36   Ht 1.88 m (6' 2\")   Wt 135.8 kg (299 lb 6.2 oz)   SpO2 92%   BMI 38.44 kg/m     Date 23 07 - 23 0659   Shift 0281-8039 7182-6711 1258-2220 24 Hour Total   INTAKE   Shift Total(mL/kg)       OUTPUT   Urine 2775   2775   Shift Total(mL/kg) 2775(20.43)   2775(20.43)   Weight (kg) 135.8 135.8 135.8 135.8      Admit Weight:  (unwilling to stand)     GENERAL APPEARANCE: Obese male, alert and no distress  EYES: No scleral icterus  NECK:  No JVD  Pulmonary: lungs clear to auscultation with equal breath " sounds bilaterally, no clubbing or cyanosis  CV: Regular rhythm, normal rate, no rub   - Edema none  GI: soft, nontender, normal bowel sounds  MS: no evidence of inflammation in joints, no muscle tenderness  : No Daugherty  SKIN: no rash, warm, dry  NEURO: mentation intact and speech normal    LABS:   CMP  Recent Labs   Lab 04/19/23  1057 04/19/23  0853 04/19/23  0436 04/19/23  0134 04/18/23  1736   NA  --   --  137  --  136   POTASSIUM 4.2  --  3.3*  --  3.4   CHLORIDE  --   --  99  --  97*   CO2  --   --  25  --  27   ANIONGAP  --   --  13  --  12   GLC  --  105* 116* 116* 98   BUN  --   --  12.5  --  11.1   CR  --   --  1.60*  --  1.28*   GFRESTIMATED  --   --  59*  --  77   EFREM  --   --  8.7  --  9.3   MAG  --   --  1.4*  --   --    PHOS  --   --  3.2  --   --    PROTTOTAL  --   --   --   --  7.3   ALBUMIN  --   --   --   --  4.2   BILITOTAL  --   --   --   --  0.9   ALKPHOS  --   --   --   --  120   AST  --   --   --   --  22   ALT  --   --   --   --  21     CBC  Recent Labs   Lab 04/19/23  1057 04/19/23  0436 04/18/23  1736   HGB 12.1* 11.8* 12.2*   WBC 12.0* 13.9* 10.8   RBC 4.94 4.79 5.00   HCT 38.7* 38.8* 40.0   MCV 78 81 80   MCH 24.5* 24.6* 24.4*   MCHC 31.3* 30.4* 30.5*   RDW 15.4* 15.5* 15.3*    258 259     INR  Recent Labs   Lab 04/19/23  0436   INR 1.03   PTT 33     ABGNo lab results found in last 7 days.   URINE STUDIES  Recent Labs   Lab Test 04/18/23  1842   COLOR Light Yellow   APPEARANCE Clear   URINEGLC Negative   URINEBILI Negative   URINEKETONE Negative   SG 1.011   UBLD Negative   URINEPH 6.5   PROTEIN 20*   NITRITE Negative   LEUKEST Negative   RBCU 1   WBCU <1     No lab results found.  PTH  No lab results found.  IRON STUDIES  Recent Labs   Lab Test 04/19/23  0502 04/19/23  0436   IRON 57*  --      --    IRONSAT 17  --    HUSSEIN  --  57       I, Sara He, saw and evaluated this patient as part of a shared visit.  I have reviewed and discussed with the advanced  practice provider their history, physical and plan.  I have personally performed the substantive portion of the medical decision making for this visit - please see the JOSE ARMANDO's documentation for the full details  I personally reviewed the vital signs, medications, labs and imaging.    Key findings and management decisions made by me:  Hypertensive emergency admitted with flank pain with pseudoaneurysm noted and subcapsular hematoma. Will try to keep -150 for now and bring down slowly    Saar Ac He  Date of Service (when I saw the patient): 4/19           Speaking Coherently

## 2023-04-25 ENCOUNTER — APPOINTMENT (OUTPATIENT)
Dept: ORTHOPEDIC SURGERY | Facility: CLINIC | Age: 63
End: 2023-04-25

## 2023-07-20 ENCOUNTER — NON-APPOINTMENT (OUTPATIENT)
Age: 63
End: 2023-07-20

## 2023-08-01 ENCOUNTER — OUTPATIENT (OUTPATIENT)
Dept: OUTPATIENT SERVICES | Facility: HOSPITAL | Age: 63
LOS: 1 days | End: 2023-08-01
Payer: MEDICAID

## 2023-08-01 DIAGNOSIS — Z98.891 HISTORY OF UTERINE SCAR FROM PREVIOUS SURGERY: Chronic | ICD-10-CM

## 2023-08-01 DIAGNOSIS — M25.512 PAIN IN LEFT SHOULDER: ICD-10-CM

## 2023-08-01 DIAGNOSIS — Z98.890 OTHER SPECIFIED POSTPROCEDURAL STATES: Chronic | ICD-10-CM

## 2023-08-01 PROCEDURE — 73030 X-RAY EXAM OF SHOULDER: CPT | Mod: LT

## 2023-08-01 PROCEDURE — 73030 X-RAY EXAM OF SHOULDER: CPT | Mod: 26,LT

## 2023-08-02 DIAGNOSIS — M25.512 PAIN IN LEFT SHOULDER: ICD-10-CM

## 2023-08-11 ENCOUNTER — APPOINTMENT (OUTPATIENT)
Dept: ORTHOPEDIC SURGERY | Facility: CLINIC | Age: 63
End: 2023-08-11

## 2023-09-01 ENCOUNTER — APPOINTMENT (OUTPATIENT)
Dept: ORTHOPEDIC SURGERY | Facility: CLINIC | Age: 63
End: 2023-09-01

## 2023-09-18 ENCOUNTER — OUTPATIENT (OUTPATIENT)
Dept: OUTPATIENT SERVICES | Facility: HOSPITAL | Age: 63
LOS: 1 days | End: 2023-09-18
Payer: MEDICAID

## 2023-09-18 DIAGNOSIS — Z98.890 OTHER SPECIFIED POSTPROCEDURAL STATES: Chronic | ICD-10-CM

## 2023-09-18 DIAGNOSIS — Z12.31 ENCOUNTER FOR SCREENING MAMMOGRAM FOR MALIGNANT NEOPLASM OF BREAST: ICD-10-CM

## 2023-09-18 DIAGNOSIS — Z98.891 HISTORY OF UTERINE SCAR FROM PREVIOUS SURGERY: Chronic | ICD-10-CM

## 2023-09-18 PROCEDURE — 77067 SCR MAMMO BI INCL CAD: CPT

## 2023-09-18 PROCEDURE — 77063 BREAST TOMOSYNTHESIS BI: CPT | Mod: 26

## 2023-09-18 PROCEDURE — 77063 BREAST TOMOSYNTHESIS BI: CPT

## 2023-09-18 PROCEDURE — 77067 SCR MAMMO BI INCL CAD: CPT | Mod: 26

## 2023-09-19 DIAGNOSIS — Z12.31 ENCOUNTER FOR SCREENING MAMMOGRAM FOR MALIGNANT NEOPLASM OF BREAST: ICD-10-CM

## 2023-12-12 ENCOUNTER — APPOINTMENT (OUTPATIENT)
Dept: VASCULAR SURGERY | Facility: CLINIC | Age: 63
End: 2023-12-12
Payer: MEDICAID

## 2023-12-12 VITALS
HEIGHT: 65 IN | SYSTOLIC BLOOD PRESSURE: 129 MMHG | WEIGHT: 180 LBS | BODY MASS INDEX: 29.99 KG/M2 | DIASTOLIC BLOOD PRESSURE: 79 MMHG

## 2023-12-12 DIAGNOSIS — M79.661 PAIN IN RIGHT LOWER LEG: ICD-10-CM

## 2023-12-12 DIAGNOSIS — M79.89 PAIN IN RIGHT LOWER LEG: ICD-10-CM

## 2023-12-12 PROCEDURE — 93971 EXTREMITY STUDY: CPT

## 2023-12-12 PROCEDURE — 99213 OFFICE O/P EST LOW 20 MIN: CPT

## 2023-12-28 ENCOUNTER — APPOINTMENT (OUTPATIENT)
Dept: ORTHOPEDIC SURGERY | Facility: CLINIC | Age: 63
End: 2023-12-28
Payer: MEDICAID

## 2023-12-28 PROCEDURE — 99213 OFFICE O/P EST LOW 20 MIN: CPT | Mod: 25

## 2023-12-28 PROCEDURE — 20610 DRAIN/INJ JOINT/BURSA W/O US: CPT | Mod: RT

## 2023-12-28 PROCEDURE — 73562 X-RAY EXAM OF KNEE 3: CPT | Mod: RT

## 2023-12-28 NOTE — IMAGING
[de-identified] : On examination of the right knee no obvious swelling, no ecchymosis, no erythema.  Skin is intact, she does have some varicosities noted.  She is under the care of vascular.  Calf is soft nontender.  No palpable joint line tenderness.  No tenderness over the patella, patella tendon or quad tendon.  Calf is soft.  X-ray right knee near bone-on-bone lateral compartment arthritis.  No fracture or dislocation

## 2023-12-28 NOTE — ASSESSMENT
[FreeTextEntry1] : Went over options we went over options.  We agreed to do cortisone injection.  Today in the office under sterile conditions injected the right knee lateral approach 3 cc lidocaine, 2 cc of dexamethasone.  Patient tolerated the procedure well.  Puncture site covered with a Band-Aid.  Icing if she has any soreness.  We discussed viscosupplement injections in the future.  She will give us a call as needed.  She is not inclined to pursue surgery at this time.

## 2023-12-28 NOTE — HISTORY OF PRESENT ILLNESS
[de-identified] : 63-year-old female comes in today for repeat evaluation of her right knee pain and arthritis.  She was last seen a few years ago, she had cortisone injections in the past with relief.  Over the past month the pain has worsened, no specific trauma.  She takes Motrin as needed.

## 2024-01-01 ENCOUNTER — OUTPATIENT (OUTPATIENT)
Dept: OUTPATIENT SERVICES | Facility: HOSPITAL | Age: 64
LOS: 1 days | End: 2024-01-01
Payer: MEDICAID

## 2024-01-01 ENCOUNTER — RESULT REVIEW (OUTPATIENT)
Age: 64
End: 2024-01-01

## 2024-01-01 ENCOUNTER — INPATIENT (INPATIENT)
Facility: HOSPITAL | Age: 64
LOS: 0 days | DRG: 196 | End: 2024-11-23
Attending: STUDENT IN AN ORGANIZED HEALTH CARE EDUCATION/TRAINING PROGRAM | Admitting: STUDENT IN AN ORGANIZED HEALTH CARE EDUCATION/TRAINING PROGRAM
Payer: MEDICAID

## 2024-01-01 VITALS — SYSTOLIC BLOOD PRESSURE: 115 MMHG | HEART RATE: 99 BPM | DIASTOLIC BLOOD PRESSURE: 56 MMHG | OXYGEN SATURATION: 99 %

## 2024-01-01 VITALS — HEART RATE: 107 BPM | RESPIRATION RATE: 30 BRPM | OXYGEN SATURATION: 95 %

## 2024-01-01 DIAGNOSIS — Z13.820 ENCOUNTER FOR SCREENING FOR OSTEOPOROSIS: ICD-10-CM

## 2024-01-01 DIAGNOSIS — Z98.891 HISTORY OF UTERINE SCAR FROM PREVIOUS SURGERY: Chronic | ICD-10-CM

## 2024-01-01 DIAGNOSIS — Z00.8 ENCOUNTER FOR OTHER GENERAL EXAMINATION: ICD-10-CM

## 2024-01-01 DIAGNOSIS — I46.9 CARDIAC ARREST, CAUSE UNSPECIFIED: ICD-10-CM

## 2024-01-01 DIAGNOSIS — Z98.890 OTHER SPECIFIED POSTPROCEDURAL STATES: Chronic | ICD-10-CM

## 2024-01-01 DIAGNOSIS — R10.2 PELVIC AND PERINEAL PAIN: ICD-10-CM

## 2024-01-01 LAB
24R-OH-CALCIDIOL SERPL-MCNC: 28 NG/ML — LOW (ref 30–80)
A1C WITH ESTIMATED AVERAGE GLUCOSE RESULT: 5.3 % — SIGNIFICANT CHANGE UP (ref 4–5.6)
ALBUMIN SERPL ELPH-MCNC: 3.4 G/DL — LOW (ref 3.5–5.2)
ALBUMIN SERPL ELPH-MCNC: 3.8 G/DL — SIGNIFICANT CHANGE UP (ref 3.5–5.2)
ALBUMIN SERPL ELPH-MCNC: 4.1 G/DL — SIGNIFICANT CHANGE UP (ref 3.5–5.2)
ALP SERPL-CCNC: 119 U/L — HIGH (ref 30–115)
ALP SERPL-CCNC: 163 U/L — HIGH (ref 30–115)
ALP SERPL-CCNC: 210 U/L — HIGH (ref 30–115)
ALT FLD-CCNC: 307 U/L — HIGH (ref 0–41)
ALT FLD-CCNC: 446 U/L — HIGH (ref 0–41)
ALT FLD-CCNC: 537 U/L — HIGH (ref 0–41)
ANION GAP SERPL CALC-SCNC: 13 MMOL/L — SIGNIFICANT CHANGE UP (ref 7–14)
ANION GAP SERPL CALC-SCNC: 17 MMOL/L — HIGH (ref 7–14)
ANION GAP SERPL CALC-SCNC: 18 MMOL/L — HIGH (ref 7–14)
APTT BLD: 108.6 SEC — CRITICAL HIGH (ref 27–39.2)
APTT BLD: 121.1 SEC — CRITICAL HIGH (ref 27–39.2)
APTT BLD: 32.7 SEC — SIGNIFICANT CHANGE UP (ref 27–39.2)
APTT BLD: 59.3 SEC — HIGH (ref 27–39.2)
AST SERPL-CCNC: 222 U/L — HIGH (ref 0–41)
AST SERPL-CCNC: 453 U/L — HIGH (ref 0–41)
AST SERPL-CCNC: 476 U/L — HIGH (ref 0–41)
BASE EXCESS BLDA CALC-SCNC: -7.5 MMOL/L — LOW (ref -2–3)
BASOPHILS # BLD AUTO: 0 K/UL — SIGNIFICANT CHANGE UP (ref 0–0.2)
BASOPHILS # BLD AUTO: 0.04 K/UL — SIGNIFICANT CHANGE UP (ref 0–0.2)
BASOPHILS NFR BLD AUTO: 0 % — SIGNIFICANT CHANGE UP (ref 0–1)
BASOPHILS NFR BLD AUTO: 0.2 % — SIGNIFICANT CHANGE UP (ref 0–1)
BILIRUB SERPL-MCNC: 0.3 MG/DL — SIGNIFICANT CHANGE UP (ref 0.2–1.2)
BILIRUB SERPL-MCNC: 0.4 MG/DL — SIGNIFICANT CHANGE UP (ref 0.2–1.2)
BILIRUB SERPL-MCNC: <0.2 MG/DL — SIGNIFICANT CHANGE UP (ref 0.2–1.2)
BUN SERPL-MCNC: 17 MG/DL — SIGNIFICANT CHANGE UP (ref 10–20)
BUN SERPL-MCNC: 22 MG/DL — HIGH (ref 10–20)
BUN SERPL-MCNC: 26 MG/DL — HIGH (ref 10–20)
CALCIUM SERPL-MCNC: 10.3 MG/DL — SIGNIFICANT CHANGE UP (ref 8.4–10.5)
CALCIUM SERPL-MCNC: 12.9 MG/DL — HIGH (ref 8.4–10.4)
CALCIUM SERPL-MCNC: 9.1 MG/DL — SIGNIFICANT CHANGE UP (ref 8.4–10.4)
CALCIUM SERPL-MCNC: 9.7 MG/DL — SIGNIFICANT CHANGE UP (ref 8.4–10.5)
CHLORIDE SERPL-SCNC: 100 MMOL/L — SIGNIFICANT CHANGE UP (ref 98–110)
CHLORIDE SERPL-SCNC: 106 MMOL/L — SIGNIFICANT CHANGE UP (ref 98–110)
CHLORIDE SERPL-SCNC: 111 MMOL/L — HIGH (ref 98–110)
CHOLEST SERPL-MCNC: 156 MG/DL — SIGNIFICANT CHANGE UP
CO2 SERPL-SCNC: 17 MMOL/L — SIGNIFICANT CHANGE UP (ref 17–32)
CO2 SERPL-SCNC: 20 MMOL/L — SIGNIFICANT CHANGE UP (ref 17–32)
CO2 SERPL-SCNC: 23 MMOL/L — SIGNIFICANT CHANGE UP (ref 17–32)
CREAT SERPL-MCNC: 1.1 MG/DL — SIGNIFICANT CHANGE UP (ref 0.7–1.5)
CREAT SERPL-MCNC: 1.2 MG/DL — SIGNIFICANT CHANGE UP (ref 0.7–1.5)
CREAT SERPL-MCNC: 1.6 MG/DL — HIGH (ref 0.7–1.5)
EGFR: 36 ML/MIN/1.73M2 — LOW
EGFR: 36 ML/MIN/1.73M2 — LOW
EGFR: 51 ML/MIN/1.73M2 — LOW
EGFR: 51 ML/MIN/1.73M2 — LOW
EGFR: 56 ML/MIN/1.73M2 — LOW
EGFR: 56 ML/MIN/1.73M2 — LOW
EOSINOPHIL # BLD AUTO: 0 K/UL — SIGNIFICANT CHANGE UP (ref 0–0.7)
EOSINOPHIL # BLD AUTO: 0.01 K/UL — SIGNIFICANT CHANGE UP (ref 0–0.7)
EOSINOPHIL NFR BLD AUTO: 0 % — SIGNIFICANT CHANGE UP (ref 0–8)
EOSINOPHIL NFR BLD AUTO: 0.1 % — SIGNIFICANT CHANGE UP (ref 0–8)
ESTIMATED AVERAGE GLUCOSE: 105 MG/DL — SIGNIFICANT CHANGE UP (ref 68–114)
FLUAV AG NPH QL: SIGNIFICANT CHANGE UP
FLUBV AG NPH QL: SIGNIFICANT CHANGE UP
GAS PNL BLDA: SIGNIFICANT CHANGE UP
GAS PNL BLDV: SIGNIFICANT CHANGE UP
GLUCOSE BLDC GLUCOMTR-MCNC: 144 MG/DL — HIGH (ref 70–99)
GLUCOSE SERPL-MCNC: 129 MG/DL — HIGH (ref 70–99)
GLUCOSE SERPL-MCNC: 277 MG/DL — HIGH (ref 70–99)
GLUCOSE SERPL-MCNC: 363 MG/DL — HIGH (ref 70–99)
HCO3 BLDA-SCNC: 19 MMOL/L — LOW (ref 21–28)
HCT VFR BLD CALC: 37.9 % — SIGNIFICANT CHANGE UP (ref 37–47)
HCT VFR BLD CALC: 44.5 % — SIGNIFICANT CHANGE UP (ref 37–47)
HCT VFR BLD CALC: 45.7 % — SIGNIFICANT CHANGE UP (ref 37–47)
HCT VFR BLD CALC: 48 % — HIGH (ref 37–47)
HDLC SERPL-MCNC: 59 MG/DL — SIGNIFICANT CHANGE UP
HGB BLD-MCNC: 12.6 G/DL — SIGNIFICANT CHANGE UP (ref 12–16)
HGB BLD-MCNC: 15.2 G/DL — SIGNIFICANT CHANGE UP (ref 12–16)
HGB BLD-MCNC: 15.5 G/DL — SIGNIFICANT CHANGE UP (ref 12–16)
HGB BLD-MCNC: 16.1 G/DL — HIGH (ref 12–16)
IMM GRANULOCYTES NFR BLD AUTO: 0.5 % — HIGH (ref 0.1–0.3)
INR BLD: 0.99 RATIO — SIGNIFICANT CHANGE UP (ref 0.65–1.3)
INR BLD: 1.02 RATIO — SIGNIFICANT CHANGE UP (ref 0.65–1.3)
LACTATE SERPL-SCNC: 3.4 MMOL/L — HIGH (ref 0.7–2)
LACTATE SERPL-SCNC: 3.9 MMOL/L — HIGH (ref 0.7–2)
LDLC SERPL-MCNC: 78 MG/DL — SIGNIFICANT CHANGE UP
LIPID PNL WITH DIRECT LDL SERPL: 78 MG/DL — SIGNIFICANT CHANGE UP
LYMPHOCYTES # BLD AUTO: 1.4 K/UL — SIGNIFICANT CHANGE UP (ref 1.2–3.4)
LYMPHOCYTES # BLD AUTO: 65 % — HIGH (ref 20.5–51.1)
LYMPHOCYTES # BLD AUTO: 7.4 % — LOW (ref 20.5–51.1)
LYMPHOCYTES # BLD AUTO: 7.56 K/UL — HIGH (ref 1.2–3.4)
MAGNESIUM SERPL-MCNC: 2.4 MG/DL — SIGNIFICANT CHANGE UP (ref 1.8–2.4)
MAGNESIUM SERPL-MCNC: 2.5 MG/DL — HIGH (ref 1.8–2.4)
MAGNESIUM SERPL-MCNC: 2.6 MG/DL — HIGH (ref 1.8–2.4)
MCHC RBC-ENTMCNC: 30.6 PG — SIGNIFICANT CHANGE UP (ref 27–31)
MCHC RBC-ENTMCNC: 30.8 PG — SIGNIFICANT CHANGE UP (ref 27–31)
MCHC RBC-ENTMCNC: 30.8 PG — SIGNIFICANT CHANGE UP (ref 27–31)
MCHC RBC-ENTMCNC: 30.9 PG — SIGNIFICANT CHANGE UP (ref 27–31)
MCHC RBC-ENTMCNC: 33.2 G/DL — SIGNIFICANT CHANGE UP (ref 32–37)
MCHC RBC-ENTMCNC: 33.5 G/DL — SIGNIFICANT CHANGE UP (ref 32–37)
MCHC RBC-ENTMCNC: 33.9 G/DL — SIGNIFICANT CHANGE UP (ref 32–37)
MCHC RBC-ENTMCNC: 34.2 G/DL — SIGNIFICANT CHANGE UP (ref 32–37)
MCV RBC AUTO: 90.1 FL — SIGNIFICANT CHANGE UP (ref 81–99)
MCV RBC AUTO: 90.7 FL — SIGNIFICANT CHANGE UP (ref 81–99)
MCV RBC AUTO: 91.3 FL — SIGNIFICANT CHANGE UP (ref 81–99)
MCV RBC AUTO: 92.9 FL — SIGNIFICANT CHANGE UP (ref 81–99)
MONOCYTES # BLD AUTO: 0.47 K/UL — SIGNIFICANT CHANGE UP (ref 0.1–0.6)
MONOCYTES # BLD AUTO: 1.2 K/UL — HIGH (ref 0.1–0.6)
MONOCYTES NFR BLD AUTO: 4 % — SIGNIFICANT CHANGE UP (ref 1.7–9.3)
MONOCYTES NFR BLD AUTO: 6.3 % — SIGNIFICANT CHANGE UP (ref 1.7–9.3)
MRSA PCR RESULT.: NEGATIVE — SIGNIFICANT CHANGE UP
NEUTROPHILS # BLD AUTO: 16.16 K/UL — HIGH (ref 1.4–6.5)
NEUTROPHILS # BLD AUTO: 3.02 K/UL — SIGNIFICANT CHANGE UP (ref 1.4–6.5)
NEUTROPHILS NFR BLD AUTO: 26 % — LOW (ref 42.2–75.2)
NEUTROPHILS NFR BLD AUTO: 85.5 % — HIGH (ref 42.2–75.2)
NONHDLC SERPL-MCNC: 97 MG/DL — SIGNIFICANT CHANGE UP
NRBC # BLD: 0 /100 WBCS — SIGNIFICANT CHANGE UP (ref 0–0)
NRBC # BLD: SIGNIFICANT CHANGE UP /100 WBCS (ref 0–0)
NRBC BLD-RTO: 0 /100 WBCS — SIGNIFICANT CHANGE UP (ref 0–0)
NRBC BLD-RTO: SIGNIFICANT CHANGE UP /100 WBCS (ref 0–0)
NT-PROBNP SERPL-SCNC: 77 PG/ML — SIGNIFICANT CHANGE UP (ref 0–300)
PCO2 BLDA: 43 MMHG — SIGNIFICANT CHANGE UP (ref 32–45)
PH BLDA: 7.26 — LOW (ref 7.35–7.45)
PHOSPHATE SERPL-MCNC: 4.9 MG/DL — SIGNIFICANT CHANGE UP (ref 2.1–4.9)
PLATELET # BLD AUTO: 198 K/UL — SIGNIFICANT CHANGE UP (ref 130–400)
PLATELET # BLD AUTO: 314 K/UL — SIGNIFICANT CHANGE UP (ref 130–400)
PLATELET # BLD AUTO: 317 K/UL — SIGNIFICANT CHANGE UP (ref 130–400)
PLATELET # BLD AUTO: 348 K/UL — SIGNIFICANT CHANGE UP (ref 130–400)
PMV BLD: 9 FL — SIGNIFICANT CHANGE UP (ref 7.4–10.4)
PMV BLD: 9.1 FL — SIGNIFICANT CHANGE UP (ref 7.4–10.4)
PMV BLD: 9.2 FL — SIGNIFICANT CHANGE UP (ref 7.4–10.4)
PMV BLD: 9.2 FL — SIGNIFICANT CHANGE UP (ref 7.4–10.4)
PO2 BLDA: 218 MMHG — HIGH (ref 83–108)
POTASSIUM SERPL-MCNC: 3.3 MMOL/L — LOW (ref 3.5–5)
POTASSIUM SERPL-MCNC: 4.4 MMOL/L — SIGNIFICANT CHANGE UP (ref 3.5–5)
POTASSIUM SERPL-MCNC: 4.8 MMOL/L — SIGNIFICANT CHANGE UP (ref 3.5–5)
POTASSIUM SERPL-SCNC: 3.3 MMOL/L — LOW (ref 3.5–5)
POTASSIUM SERPL-SCNC: 4.4 MMOL/L — SIGNIFICANT CHANGE UP (ref 3.5–5)
POTASSIUM SERPL-SCNC: 4.8 MMOL/L — SIGNIFICANT CHANGE UP (ref 3.5–5)
PROCALCITONIN SERPL-MCNC: 0.51 NG/ML — HIGH (ref 0.02–0.1)
PROT SERPL-MCNC: 5 G/DL — LOW (ref 6–8)
PROT SERPL-MCNC: 5.5 G/DL — LOW (ref 6–8)
PROT SERPL-MCNC: 6.4 G/DL — SIGNIFICANT CHANGE UP (ref 6–8)
PROTHROM AB SERPL-ACNC: 11.7 SEC — SIGNIFICANT CHANGE UP (ref 9.95–12.87)
PROTHROM AB SERPL-ACNC: 12 SEC — SIGNIFICANT CHANGE UP (ref 9.95–12.87)
PTH-INTACT FLD-MCNC: 38 PG/ML — SIGNIFICANT CHANGE UP (ref 15–65)
RBC # BLD: 4.08 M/UL — LOW (ref 4.2–5.4)
RBC # BLD: 4.94 M/UL — SIGNIFICANT CHANGE UP (ref 4.2–5.4)
RBC # BLD: 5.04 M/UL — SIGNIFICANT CHANGE UP (ref 4.2–5.4)
RBC # BLD: 5.26 M/UL — SIGNIFICANT CHANGE UP (ref 4.2–5.4)
RBC # FLD: 12.8 % — SIGNIFICANT CHANGE UP (ref 11.5–14.5)
RBC # FLD: 13 % — SIGNIFICANT CHANGE UP (ref 11.5–14.5)
RBC # FLD: 13.2 % — SIGNIFICANT CHANGE UP (ref 11.5–14.5)
RBC # FLD: 13.2 % — SIGNIFICANT CHANGE UP (ref 11.5–14.5)
RSV RNA NPH QL NAA+NON-PROBE: SIGNIFICANT CHANGE UP
SAO2 % BLDA: 100 % — HIGH (ref 94–98)
SARS-COV-2 RNA SPEC QL NAA+PROBE: SIGNIFICANT CHANGE UP
SODIUM SERPL-SCNC: 140 MMOL/L — SIGNIFICANT CHANGE UP (ref 135–146)
SODIUM SERPL-SCNC: 141 MMOL/L — SIGNIFICANT CHANGE UP (ref 135–146)
SODIUM SERPL-SCNC: 144 MMOL/L — SIGNIFICANT CHANGE UP (ref 135–146)
T4 FREE SERPL-MCNC: 0.9 NG/DL — SIGNIFICANT CHANGE UP (ref 0.9–1.8)
TRIGL SERPL-MCNC: 98 MG/DL — SIGNIFICANT CHANGE UP
TROPONIN T, HIGH SENSITIVITY RESULT: 1042 NG/L — CRITICAL HIGH (ref 6–13)
TROPONIN T, HIGH SENSITIVITY RESULT: 1874 NG/L — CRITICAL HIGH (ref 6–13)
TROPONIN T, HIGH SENSITIVITY RESULT: 26 NG/L — HIGH (ref 6–13)
TSH SERPL-MCNC: 2.45 UIU/ML — SIGNIFICANT CHANGE UP (ref 0.27–4.2)
VIT D25+D1,25 OH+D1,25 PNL SERPL-MCNC: 54.1 PG/ML — SIGNIFICANT CHANGE UP (ref 19.9–79.3)
WBC # BLD: 11.63 K/UL — HIGH (ref 4.8–10.8)
WBC # BLD: 18.91 K/UL — HIGH (ref 4.8–10.8)
WBC # BLD: 20.7 K/UL — HIGH (ref 4.8–10.8)
WBC # BLD: 25.08 K/UL — HIGH (ref 4.8–10.8)
WBC # FLD AUTO: 11.63 K/UL — HIGH (ref 4.8–10.8)
WBC # FLD AUTO: 18.91 K/UL — HIGH (ref 4.8–10.8)
WBC # FLD AUTO: 20.7 K/UL — HIGH (ref 4.8–10.8)
WBC # FLD AUTO: 25.08 K/UL — HIGH (ref 4.8–10.8)

## 2024-01-01 PROCEDURE — 82652 VIT D 1 25-DIHYDROXY: CPT

## 2024-01-01 PROCEDURE — 93010 ELECTROCARDIOGRAM REPORT: CPT

## 2024-01-01 PROCEDURE — 71250 CT THORAX DX C-: CPT | Mod: 26

## 2024-01-01 PROCEDURE — 85018 HEMOGLOBIN: CPT

## 2024-01-01 PROCEDURE — 74018 RADEX ABDOMEN 1 VIEW: CPT | Mod: 26

## 2024-01-01 PROCEDURE — 71045 X-RAY EXAM CHEST 1 VIEW: CPT | Mod: 26,76

## 2024-01-01 PROCEDURE — 93307 TTE W/O DOPPLER COMPLETE: CPT

## 2024-01-01 PROCEDURE — 71045 X-RAY EXAM CHEST 1 VIEW: CPT | Mod: 26

## 2024-01-01 PROCEDURE — 76830 TRANSVAGINAL US NON-OB: CPT

## 2024-01-01 PROCEDURE — 99291 CRITICAL CARE FIRST HOUR: CPT | Mod: 25

## 2024-01-01 PROCEDURE — 82330 ASSAY OF CALCIUM: CPT

## 2024-01-01 PROCEDURE — 74176 CT ABD & PELVIS W/O CONTRAST: CPT | Mod: 26

## 2024-01-01 PROCEDURE — 83605 ASSAY OF LACTIC ACID: CPT

## 2024-01-01 PROCEDURE — 76830 TRANSVAGINAL US NON-OB: CPT | Mod: 26

## 2024-01-01 PROCEDURE — 36556 INSERT NON-TUNNEL CV CATH: CPT

## 2024-01-01 PROCEDURE — 85025 COMPLETE CBC W/AUTO DIFF WBC: CPT

## 2024-01-01 PROCEDURE — 85027 COMPLETE CBC AUTOMATED: CPT

## 2024-01-01 PROCEDURE — 71250 CT THORAX DX C-: CPT | Mod: MC

## 2024-01-01 PROCEDURE — 76856 US EXAM PELVIC COMPLETE: CPT | Mod: 26

## 2024-01-01 PROCEDURE — 85610 PROTHROMBIN TIME: CPT

## 2024-01-01 PROCEDURE — 77080 DXA BONE DENSITY AXIAL: CPT | Mod: 26

## 2024-01-01 PROCEDURE — 84145 PROCALCITONIN (PCT): CPT

## 2024-01-01 PROCEDURE — 70450 CT HEAD/BRAIN W/O DYE: CPT | Mod: 26

## 2024-01-01 PROCEDURE — 71045 X-RAY EXAM CHEST 1 VIEW: CPT

## 2024-01-01 PROCEDURE — 74018 RADEX ABDOMEN 1 VIEW: CPT

## 2024-01-01 PROCEDURE — 77080 DXA BONE DENSITY AXIAL: CPT

## 2024-01-01 PROCEDURE — 82803 BLOOD GASES ANY COMBINATION: CPT

## 2024-01-01 PROCEDURE — 72125 CT NECK SPINE W/O DYE: CPT | Mod: 26

## 2024-01-01 PROCEDURE — 84295 ASSAY OF SERUM SODIUM: CPT

## 2024-01-01 PROCEDURE — 84484 ASSAY OF TROPONIN QUANT: CPT

## 2024-01-01 PROCEDURE — 84443 ASSAY THYROID STIM HORMONE: CPT

## 2024-01-01 PROCEDURE — 87641 MR-STAPH DNA AMP PROBE: CPT

## 2024-01-01 PROCEDURE — 87640 STAPH A DNA AMP PROBE: CPT

## 2024-01-01 PROCEDURE — 76856 US EXAM PELVIC COMPLETE: CPT

## 2024-01-01 PROCEDURE — 82306 VITAMIN D 25 HYDROXY: CPT

## 2024-01-01 PROCEDURE — 85730 THROMBOPLASTIN TIME PARTIAL: CPT

## 2024-01-01 PROCEDURE — 84132 ASSAY OF SERUM POTASSIUM: CPT

## 2024-01-01 PROCEDURE — 70450 CT HEAD/BRAIN W/O DYE: CPT | Mod: MC

## 2024-01-01 PROCEDURE — 36415 COLL VENOUS BLD VENIPUNCTURE: CPT

## 2024-01-01 PROCEDURE — 82962 GLUCOSE BLOOD TEST: CPT

## 2024-01-01 PROCEDURE — 84439 ASSAY OF FREE THYROXINE: CPT

## 2024-01-01 PROCEDURE — 93306 TTE W/DOPPLER COMPLETE: CPT | Mod: 26

## 2024-01-01 PROCEDURE — 99254 IP/OBS CNSLTJ NEW/EST MOD 60: CPT

## 2024-01-01 PROCEDURE — 80053 COMPREHEN METABOLIC PANEL: CPT

## 2024-01-01 PROCEDURE — 83735 ASSAY OF MAGNESIUM: CPT

## 2024-01-01 PROCEDURE — 85014 HEMATOCRIT: CPT

## 2024-01-01 PROCEDURE — 43753 TX GASTRO INTUB W/ASP: CPT | Mod: 59

## 2024-01-01 PROCEDURE — 83970 ASSAY OF PARATHORMONE: CPT

## 2024-01-01 PROCEDURE — 72125 CT NECK SPINE W/O DYE: CPT | Mod: MC

## 2024-01-01 PROCEDURE — 0241U: CPT

## 2024-01-01 PROCEDURE — 80061 LIPID PANEL: CPT

## 2024-01-01 PROCEDURE — 82310 ASSAY OF CALCIUM: CPT

## 2024-01-01 PROCEDURE — 95819 EEG AWAKE AND ASLEEP: CPT | Mod: 26

## 2024-01-01 PROCEDURE — 83036 HEMOGLOBIN GLYCOSYLATED A1C: CPT

## 2024-01-01 PROCEDURE — 84100 ASSAY OF PHOSPHORUS: CPT

## 2024-01-01 PROCEDURE — 36620 INSERTION CATHETER ARTERY: CPT

## 2024-01-01 PROCEDURE — 74176 CT ABD & PELVIS W/O CONTRAST: CPT | Mod: MC

## 2024-01-01 PROCEDURE — 99223 1ST HOSP IP/OBS HIGH 75: CPT

## 2024-01-01 RX ORDER — METRONIDAZOLE 250 MG
TABLET ORAL
Refills: 0 | Status: DISCONTINUED | OUTPATIENT
Start: 2024-01-01 | End: 2024-01-01

## 2024-01-01 RX ORDER — GLUCAGON 3 MG/1
1 POWDER NASAL ONCE
Refills: 0 | Status: DISCONTINUED | OUTPATIENT
Start: 2024-01-01 | End: 2024-01-01

## 2024-01-01 RX ORDER — NOREPINEPHRINE BITARTRATE 8 MG
0.05 SOLUTION INTRAVENOUS
Qty: 8 | Refills: 0 | Status: DISCONTINUED | OUTPATIENT
Start: 2024-01-01 | End: 2024-01-01

## 2024-01-01 RX ORDER — SENNA 187 MG
2 TABLET ORAL AT BEDTIME
Refills: 0 | Status: DISCONTINUED | OUTPATIENT
Start: 2024-01-01 | End: 2024-01-01

## 2024-01-01 RX ORDER — LORAZEPAM 4 MG/ML
0.5 VIAL (ML) INJECTION EVERY 4 HOURS
Refills: 0 | Status: DISCONTINUED | OUTPATIENT
Start: 2024-01-01 | End: 2024-01-01

## 2024-01-01 RX ORDER — OMEPRAZOLE 20 MG/1
1 CAPSULE, DELAYED RELEASE ORAL
Refills: 0 | DISCHARGE

## 2024-01-01 RX ORDER — PROPOFOL 10 MG/ML
10 INJECTION, EMULSION INTRAVENOUS
Qty: 1000 | Refills: 0 | Status: DISCONTINUED | OUTPATIENT
Start: 2024-01-01 | End: 2024-01-01

## 2024-01-01 RX ORDER — POLYETHYLENE GLYCOL 3350 17 G/17G
17 POWDER, FOR SOLUTION ORAL
Refills: 0 | Status: DISCONTINUED | OUTPATIENT
Start: 2024-01-01 | End: 2024-01-01

## 2024-01-01 RX ORDER — SODIUM CHLORIDE 9 G/1000ML
250 INJECTION, SOLUTION INTRAVENOUS ONCE
Refills: 0 | Status: COMPLETED | OUTPATIENT
Start: 2024-01-01 | End: 2024-01-01

## 2024-01-01 RX ORDER — INSULIN LISPRO 100 U/ML
INJECTION, SOLUTION INTRAVENOUS; SUBCUTANEOUS
Refills: 0 | Status: DISCONTINUED | OUTPATIENT
Start: 2024-01-01 | End: 2024-01-01

## 2024-01-01 RX ORDER — CLOPIDOGREL BISULFATE 75 MG/1
300 TABLET, FILM COATED ORAL ONCE
Refills: 0 | Status: DISCONTINUED | OUTPATIENT
Start: 2024-01-01 | End: 2024-01-01

## 2024-01-01 RX ORDER — TICAGRELOR 90 MG/1
90 TABLET ORAL EVERY 12 HOURS
Refills: 0 | Status: DISCONTINUED | OUTPATIENT
Start: 2024-01-01 | End: 2024-01-01

## 2024-01-01 RX ORDER — SCOPOLAMINE 1 MG/3D
1 PATCH, EXTENDED RELEASE TRANSDERMAL
Refills: 0 | Status: DISCONTINUED | OUTPATIENT
Start: 2024-01-01 | End: 2024-01-01

## 2024-01-01 RX ORDER — SODIUM CHLORIDE 9 G/1000ML
1000 INJECTION, SOLUTION INTRAVENOUS
Refills: 0 | Status: DISCONTINUED | OUTPATIENT
Start: 2024-01-01 | End: 2024-01-01

## 2024-01-01 RX ORDER — METRONIDAZOLE 250 MG
500 TABLET ORAL ONCE
Refills: 0 | Status: COMPLETED | OUTPATIENT
Start: 2024-01-01 | End: 2024-01-01

## 2024-01-01 RX ORDER — SODIUM BICARBONATE 1 MEQ/ML
50 SYRINGE (ML) INTRAVENOUS ONCE
Refills: 0 | Status: COMPLETED | OUTPATIENT
Start: 2024-01-01 | End: 2024-01-01

## 2024-01-01 RX ORDER — ATORVASTATIN CALCIUM 80 MG/1
1 TABLET, FILM COATED ORAL
Refills: 0 | DISCHARGE

## 2024-01-01 RX ORDER — GLYCOPYRROLATE 0.2 MG/ML
0.4 INJECTION INTRAMUSCULAR; INTRAVENOUS
Refills: 0 | Status: DISCONTINUED | OUTPATIENT
Start: 2024-01-01 | End: 2024-01-01

## 2024-01-01 RX ORDER — ASPIRIN 325 MG
81 TABLET ORAL DAILY
Refills: 0 | Status: DISCONTINUED | OUTPATIENT
Start: 2024-01-01 | End: 2024-01-01

## 2024-01-01 RX ORDER — TICAGRELOR 90 MG/1
180 TABLET ORAL ONCE
Refills: 0 | Status: COMPLETED | OUTPATIENT
Start: 2024-01-01 | End: 2024-01-01

## 2024-01-01 RX ORDER — LACTULOSE 10 G/15ML
200 SOLUTION ORAL
Refills: 0 | Status: DISCONTINUED | OUTPATIENT
Start: 2024-01-01 | End: 2024-01-01

## 2024-01-01 RX ORDER — FENTANYL CITRATE-0.9 % NACL/PF 100MCG/2ML
0.5 SYRINGE (ML) INTRAVENOUS
Qty: 2500 | Refills: 0 | Status: DISCONTINUED | OUTPATIENT
Start: 2024-01-01 | End: 2024-01-01

## 2024-01-01 RX ORDER — SODIUM BICARBONATE 1 MEQ/ML
100 SYRINGE (ML) INTRAVENOUS ONCE
Refills: 0 | Status: COMPLETED | OUTPATIENT
Start: 2024-01-01 | End: 2024-01-01

## 2024-01-01 RX ORDER — AMIODARONE HYDROCHLORIDE 50 MG/ML
0.5 INJECTION, SOLUTION INTRAVENOUS
Qty: 450 | Refills: 0 | Status: DISCONTINUED | OUTPATIENT
Start: 2024-01-01 | End: 2024-01-01

## 2024-01-01 RX ORDER — HEPARIN SODIUM 1000 [USP'U]/ML
INJECTION INTRAVENOUS; SUBCUTANEOUS
Qty: 25000 | Refills: 0 | Status: DISCONTINUED | OUTPATIENT
Start: 2024-01-01 | End: 2024-01-01

## 2024-01-01 RX ORDER — METRONIDAZOLE 250 MG
500 TABLET ORAL EVERY 8 HOURS
Refills: 0 | Status: DISCONTINUED | OUTPATIENT
Start: 2024-01-01 | End: 2024-01-01

## 2024-01-01 RX ORDER — HEPARIN SODIUM 1000 [USP'U]/ML
4000 INJECTION INTRAVENOUS; SUBCUTANEOUS EVERY 6 HOURS
Refills: 0 | Status: DISCONTINUED | OUTPATIENT
Start: 2024-01-01 | End: 2024-01-01

## 2024-01-01 RX ORDER — METHYLPREDNISOLONE ACETATE 80 MG/ML
40 INJECTION, SUSPENSION INTRA-ARTICULAR; INTRALESIONAL; INTRAMUSCULAR; SOFT TISSUE EVERY 12 HOURS
Refills: 0 | Status: DISCONTINUED | OUTPATIENT
Start: 2024-01-01 | End: 2024-01-01

## 2024-01-01 RX ORDER — HEPARIN SODIUM 1000 [USP'U]/ML
4000 INJECTION INTRAVENOUS; SUBCUTANEOUS ONCE
Refills: 0 | Status: COMPLETED | OUTPATIENT
Start: 2024-01-01 | End: 2024-01-01

## 2024-01-01 RX ORDER — ATORVASTATIN CALCIUM 80 MG/1
80 TABLET, FILM COATED ORAL AT BEDTIME
Refills: 0 | Status: DISCONTINUED | OUTPATIENT
Start: 2024-01-01 | End: 2024-01-01

## 2024-01-01 RX ORDER — NOREPINEPHRINE BITARTRATE 8 MG
0.05 SOLUTION INTRAVENOUS
Qty: 16 | Refills: 0 | Status: DISCONTINUED | OUTPATIENT
Start: 2024-01-01 | End: 2024-01-01

## 2024-01-01 RX ORDER — VASOPRESSIN 20 [USP'U]/ML
0.04 INJECTION INTRAVENOUS
Qty: 40 | Refills: 0 | Status: DISCONTINUED | OUTPATIENT
Start: 2024-01-01 | End: 2024-01-01

## 2024-01-01 RX ORDER — CEFTRIAXONE 500 MG/1
1000 INJECTION, POWDER, FOR SOLUTION INTRAMUSCULAR; INTRAVENOUS EVERY 24 HOURS
Refills: 0 | Status: DISCONTINUED | OUTPATIENT
Start: 2024-01-01 | End: 2024-01-01

## 2024-01-01 RX ORDER — MAGNESIUM SULFATE 500 MG/ML
2 SYRINGE (ML) INJECTION ONCE
Refills: 0 | Status: COMPLETED | OUTPATIENT
Start: 2024-01-01 | End: 2024-01-01

## 2024-01-01 RX ORDER — AMIODARONE HYDROCHLORIDE 50 MG/ML
1 INJECTION, SOLUTION INTRAVENOUS
Qty: 450 | Refills: 0 | Status: DISCONTINUED | OUTPATIENT
Start: 2024-01-01 | End: 2024-01-01

## 2024-01-01 RX ORDER — HALOPERIDOL 10 MG/1
0.5 TABLET ORAL
Refills: 0 | Status: DISCONTINUED | OUTPATIENT
Start: 2024-01-01 | End: 2024-01-01

## 2024-01-01 RX ORDER — SODIUM BICARBONATE 1 MEQ/ML
50 SYRINGE (ML) INTRAVENOUS
Refills: 0 | Status: DISCONTINUED | OUTPATIENT
Start: 2024-01-01 | End: 2024-01-01

## 2024-01-01 RX ORDER — DEXMEDETOMIDINE HYDROCHLORIDE IN SODIUM CHLORIDE 4 UG/ML
0.2 INJECTION INTRAVENOUS
Qty: 400 | Refills: 0 | Status: DISCONTINUED | OUTPATIENT
Start: 2024-01-01 | End: 2024-01-01

## 2024-01-01 RX ORDER — INSULIN GLARGINE-YFGN 100 [IU]/ML
16 INJECTION, SOLUTION SUBCUTANEOUS AT BEDTIME
Refills: 0 | Status: DISCONTINUED | OUTPATIENT
Start: 2024-01-01 | End: 2024-01-01

## 2024-01-01 RX ORDER — ASPIRIN 325 MG
324 TABLET ORAL ONCE
Refills: 0 | Status: COMPLETED | OUTPATIENT
Start: 2024-01-01 | End: 2024-01-01

## 2024-01-01 RX ORDER — FENTANYL CITRATE-0.9 % NACL/PF 100MCG/2ML
0.5 SYRINGE (ML) INTRAVENOUS
Qty: 5000 | Refills: 0 | Status: DISCONTINUED | OUTPATIENT
Start: 2024-01-01 | End: 2024-01-01

## 2024-01-01 RX ADMIN — Medication 100 MILLILITER(S): at 01:31

## 2024-01-01 RX ADMIN — Medication 1000 MILLILITER(S): at 01:32

## 2024-01-01 RX ADMIN — METHYLPREDNISOLONE ACETATE 40 MILLIGRAM(S): 80 INJECTION, SUSPENSION INTRA-ARTICULAR; INTRALESIONAL; INTRAMUSCULAR; SOFT TISSUE at 23:30

## 2024-01-01 RX ADMIN — Medication 25 GRAM(S): at 21:59

## 2024-01-01 RX ADMIN — Medication 324 MILLIGRAM(S): at 22:11

## 2024-01-01 RX ADMIN — NOREPINEPHRINE BITARTRATE 4.08 MICROGRAM(S)/KG/MIN: 8 SOLUTION at 22:28

## 2024-01-01 RX ADMIN — Medication 50 MILLIEQUIVALENT(S): at 01:32

## 2024-01-01 RX ADMIN — HEPARIN SODIUM 750 UNIT(S)/HR: 1000 INJECTION INTRAVENOUS; SUBCUTANEOUS at 06:27

## 2024-01-01 RX ADMIN — Medication 50 MILLIEQUIVALENT(S): at 16:52

## 2024-01-01 RX ADMIN — Medication 50 MILLIEQUIVALENT(S): at 03:37

## 2024-01-01 RX ADMIN — VASOPRESSIN 6 UNIT(S)/MIN: 20 INJECTION INTRAVENOUS at 10:39

## 2024-01-01 RX ADMIN — Medication 50 MILLIEQUIVALENT(S): at 03:03

## 2024-01-01 RX ADMIN — HEPARIN SODIUM 4000 UNIT(S): 1000 INJECTION INTRAVENOUS; SUBCUTANEOUS at 21:58

## 2024-01-01 RX ADMIN — Medication 0.5 MILLIGRAM(S): at 17:36

## 2024-01-01 RX ADMIN — AMIODARONE HYDROCHLORIDE 16.7 MG/MIN: 50 INJECTION, SOLUTION INTRAVENOUS at 02:13

## 2024-01-01 RX ADMIN — Medication 40 MILLIGRAM(S): at 12:09

## 2024-01-01 RX ADMIN — Medication 2 MILLIGRAM(S): at 17:36

## 2024-01-01 RX ADMIN — ATORVASTATIN CALCIUM 80 MILLIGRAM(S): 80 TABLET, FILM COATED ORAL at 22:13

## 2024-01-01 RX ADMIN — Medication 100 MILLIGRAM(S): at 06:49

## 2024-01-01 RX ADMIN — Medication 50 MILLIEQUIVALENT(S): at 16:55

## 2024-01-01 RX ADMIN — Medication 100 MILLILITER(S): at 22:12

## 2024-01-01 RX ADMIN — Medication 1 APPLICATION(S): at 06:27

## 2024-01-01 RX ADMIN — CEFTRIAXONE 100 MILLIGRAM(S): 500 INJECTION, POWDER, FOR SOLUTION INTRAMUSCULAR; INTRAVENOUS at 09:11

## 2024-01-01 RX ADMIN — TICAGRELOR 180 MILLIGRAM(S): 90 TABLET ORAL at 22:11

## 2024-01-01 RX ADMIN — Medication 100 MILLIGRAM(S): at 13:42

## 2024-01-01 RX ADMIN — SODIUM CHLORIDE 500 MILLILITER(S): 9 INJECTION, SOLUTION INTRAVENOUS at 06:54

## 2024-01-01 RX ADMIN — Medication 50 MILLIEQUIVALENT(S): at 21:59

## 2024-01-01 RX ADMIN — PROPOFOL 5.22 MICROGRAM(S)/KG/MIN: 10 INJECTION, EMULSION INTRAVENOUS at 22:28

## 2024-01-01 RX ADMIN — HEPARIN SODIUM 1000 UNIT(S)/HR: 1000 INJECTION INTRAVENOUS; SUBCUTANEOUS at 22:10

## 2024-01-01 RX ADMIN — HEPARIN SODIUM 0 UNIT(S)/HR: 1000 INJECTION INTRAVENOUS; SUBCUTANEOUS at 05:26

## 2024-01-01 RX ADMIN — SODIUM CHLORIDE 100 MILLILITER(S): 9 INJECTION, SOLUTION INTRAVENOUS at 13:44

## 2024-01-01 RX ADMIN — AMIODARONE HYDROCHLORIDE 33.3 MG/MIN: 50 INJECTION, SOLUTION INTRAVENOUS at 20:18

## 2024-01-01 RX ADMIN — Medication 50 MILLIEQUIVALENT(S): at 16:40

## 2024-01-01 RX ADMIN — SODIUM CHLORIDE 100 MILLILITER(S): 9 INJECTION, SOLUTION INTRAVENOUS at 06:57

## 2024-01-01 RX ADMIN — Medication 4.35 MICROGRAM(S)/KG/HR: at 22:12

## 2024-01-01 RX ADMIN — Medication 50 MILLIEQUIVALENT(S): at 06:49

## 2024-01-01 RX ADMIN — Medication 100 MILLIEQUIVALENT(S): at 13:30

## 2024-02-29 ENCOUNTER — APPOINTMENT (OUTPATIENT)
Dept: ORTHOPEDIC SURGERY | Facility: CLINIC | Age: 64
End: 2024-02-29

## 2024-03-13 ENCOUNTER — NON-APPOINTMENT (OUTPATIENT)
Age: 64
End: 2024-03-13

## 2024-03-26 ENCOUNTER — NON-APPOINTMENT (OUTPATIENT)
Age: 64
End: 2024-03-26

## 2024-03-28 ENCOUNTER — APPOINTMENT (OUTPATIENT)
Dept: ORTHOPEDIC SURGERY | Facility: CLINIC | Age: 64
End: 2024-03-28

## 2024-04-16 ENCOUNTER — APPOINTMENT (OUTPATIENT)
Dept: VASCULAR SURGERY | Facility: CLINIC | Age: 64
End: 2024-04-16

## 2024-04-25 ENCOUNTER — APPOINTMENT (OUTPATIENT)
Dept: ORTHOPEDIC SURGERY | Facility: CLINIC | Age: 64
End: 2024-04-25
Payer: MEDICAID

## 2024-04-25 VITALS — BODY MASS INDEX: 29.99 KG/M2 | WEIGHT: 180 LBS | HEIGHT: 65 IN

## 2024-04-25 DIAGNOSIS — M17.11 UNILATERAL PRIMARY OSTEOARTHRITIS, RIGHT KNEE: ICD-10-CM

## 2024-04-25 DIAGNOSIS — M25.559 PAIN IN UNSPECIFIED HIP: ICD-10-CM

## 2024-04-25 PROCEDURE — 99213 OFFICE O/P EST LOW 20 MIN: CPT | Mod: 25

## 2024-04-25 PROCEDURE — 20610 DRAIN/INJ JOINT/BURSA W/O US: CPT | Mod: RT

## 2024-04-25 PROCEDURE — 73502 X-RAY EXAM HIP UNI 2-3 VIEWS: CPT

## 2024-04-25 NOTE — ASSESSMENT
[FreeTextEntry1] : We agreed to do another cortisone injection for her right knee.  Today in the office, under sterile conditions I injected the right knee lateral approach 3 cc lidocaine, 2 cc of dexamethasone.  Patient tolerated well.  Puncture of the Band-Aid.  Icing if she has any soreness.  She does walk for exercise.  In the future we could consider consider possible bursa injection if she continues to experience pain on the lateral hip.

## 2024-04-25 NOTE — IMAGING
[de-identified] : On examination of the right hip patient has good range of motion to internal and external rotation no active groin pain.  Reports pain on the lateral side of her hip down her leg around the trochanteric bursa and IT band.  She has no palpable tenderness along the joint line of the knee fairly good motion of the knee.  Most of the pain occurs with ambulating.  X-ray pelvis right hip in the office today no obvious fracture or dislocation Prior x-ray right knee tricompartmental osteoarthritis near bone-on-bone lateral compartment

## 2024-04-25 NOTE — HISTORY OF PRESENT ILLNESS
[de-identified] : 63-year-old female comes in today for evaluation of her right hip and leg pain.  No specific injury or trauma.  Pain when she is walking mainly.  Aching at night. She has history of knee arthritis.  On her last visit in December we saw her for knee osteoarthritis she had a cortisone injection.  She is interested in pursuing another injection in the knee.

## 2024-07-19 ENCOUNTER — APPOINTMENT (OUTPATIENT)
Dept: GASTROENTEROLOGY | Facility: CLINIC | Age: 64
End: 2024-07-19

## 2024-07-22 ENCOUNTER — APPOINTMENT (OUTPATIENT)
Dept: ORTHOPEDIC SURGERY | Facility: CLINIC | Age: 64
End: 2024-07-22

## 2024-08-12 ENCOUNTER — APPOINTMENT (OUTPATIENT)
Dept: PULMONOLOGY | Facility: CLINIC | Age: 64
End: 2024-08-12

## 2024-09-10 ENCOUNTER — NON-APPOINTMENT (OUTPATIENT)
Age: 64
End: 2024-09-10

## 2024-10-17 ENCOUNTER — OUTPATIENT (OUTPATIENT)
Dept: OUTPATIENT SERVICES | Facility: HOSPITAL | Age: 64
LOS: 1 days | End: 2024-10-17
Payer: MEDICAID

## 2024-10-17 DIAGNOSIS — Z98.890 OTHER SPECIFIED POSTPROCEDURAL STATES: Chronic | ICD-10-CM

## 2024-10-17 DIAGNOSIS — Z98.891 HISTORY OF UTERINE SCAR FROM PREVIOUS SURGERY: Chronic | ICD-10-CM

## 2024-10-17 DIAGNOSIS — Z12.31 ENCOUNTER FOR SCREENING MAMMOGRAM FOR MALIGNANT NEOPLASM OF BREAST: ICD-10-CM

## 2024-10-17 PROCEDURE — 77067 SCR MAMMO BI INCL CAD: CPT

## 2024-10-17 PROCEDURE — 77063 BREAST TOMOSYNTHESIS BI: CPT | Mod: 26

## 2024-10-17 PROCEDURE — 77067 SCR MAMMO BI INCL CAD: CPT | Mod: 26

## 2024-10-17 PROCEDURE — 77063 BREAST TOMOSYNTHESIS BI: CPT

## 2024-10-18 DIAGNOSIS — Z12.31 ENCOUNTER FOR SCREENING MAMMOGRAM FOR MALIGNANT NEOPLASM OF BREAST: ICD-10-CM

## 2024-10-28 ENCOUNTER — APPOINTMENT (OUTPATIENT)
Dept: PULMONOLOGY | Facility: CLINIC | Age: 64
End: 2024-10-28

## 2024-11-26 DIAGNOSIS — K21.9 GASTRO-ESOPHAGEAL REFLUX DISEASE WITHOUT ESOPHAGITIS: ICD-10-CM

## 2024-11-26 DIAGNOSIS — I46.2 CARDIAC ARREST DUE TO UNDERLYING CARDIAC CONDITION: ICD-10-CM

## 2024-11-26 DIAGNOSIS — E83.52 HYPERCALCEMIA: ICD-10-CM

## 2024-11-26 DIAGNOSIS — E87.6 HYPOKALEMIA: ICD-10-CM

## 2024-11-26 DIAGNOSIS — E87.4 MIXED DISORDER OF ACID-BASE BALANCE: ICD-10-CM

## 2024-11-26 DIAGNOSIS — Z51.5 ENCOUNTER FOR PALLIATIVE CARE: ICD-10-CM

## 2024-11-26 DIAGNOSIS — I10 ESSENTIAL (PRIMARY) HYPERTENSION: ICD-10-CM

## 2024-11-26 DIAGNOSIS — G93.5 COMPRESSION OF BRAIN: ICD-10-CM

## 2024-11-26 DIAGNOSIS — I47.20 VENTRICULAR TACHYCARDIA, UNSPECIFIED: ICD-10-CM

## 2024-11-26 DIAGNOSIS — Z88.0 ALLERGY STATUS TO PENICILLIN: ICD-10-CM

## 2024-11-26 DIAGNOSIS — I48.91 UNSPECIFIED ATRIAL FIBRILLATION: ICD-10-CM

## 2024-11-26 DIAGNOSIS — J98.2 INTERSTITIAL EMPHYSEMA: ICD-10-CM

## 2024-11-26 DIAGNOSIS — R74.01 ELEVATION OF LEVELS OF LIVER TRANSAMINASE LEVELS: ICD-10-CM

## 2024-11-26 DIAGNOSIS — I60.6 NONTRAUMATIC SUBARACHNOID HEMORRHAGE FROM OTHER INTRACRANIAL ARTERIES: ICD-10-CM

## 2024-11-26 DIAGNOSIS — K56.7 ILEUS, UNSPECIFIED: ICD-10-CM

## 2024-11-26 DIAGNOSIS — E78.5 HYPERLIPIDEMIA, UNSPECIFIED: ICD-10-CM

## 2024-11-26 DIAGNOSIS — J96.01 ACUTE RESPIRATORY FAILURE WITH HYPOXIA: ICD-10-CM

## 2024-11-26 DIAGNOSIS — G93.1 ANOXIC BRAIN DAMAGE, NOT ELSEWHERE CLASSIFIED: ICD-10-CM

## 2024-11-26 DIAGNOSIS — Z87.891 PERSONAL HISTORY OF NICOTINE DEPENDENCE: ICD-10-CM

## 2024-11-26 DIAGNOSIS — Z11.52 ENCOUNTER FOR SCREENING FOR COVID-19: ICD-10-CM

## 2024-11-26 DIAGNOSIS — I21.3 ST ELEVATION (STEMI) MYOCARDIAL INFARCTION OF UNSPECIFIED SITE: ICD-10-CM

## 2024-11-26 DIAGNOSIS — K56.609 UNSPECIFIED INTESTINAL OBSTRUCTION, UNSPECIFIED AS TO PARTIAL VERSUS COMPLETE OBSTRUCTION: ICD-10-CM

## 2024-11-26 DIAGNOSIS — J93.9 PNEUMOTHORAX, UNSPECIFIED: ICD-10-CM

## 2024-11-26 DIAGNOSIS — M96.A1: ICD-10-CM

## 2024-11-26 DIAGNOSIS — J69.0 PNEUMONITIS DUE TO INHALATION OF FOOD AND VOMIT: ICD-10-CM

## 2024-12-11 ENCOUNTER — APPOINTMENT (OUTPATIENT)
Dept: GASTROENTEROLOGY | Facility: CLINIC | Age: 64
End: 2024-12-11